# Patient Record
Sex: MALE | Race: OTHER | HISPANIC OR LATINO | Employment: OTHER | URBAN - METROPOLITAN AREA
[De-identification: names, ages, dates, MRNs, and addresses within clinical notes are randomized per-mention and may not be internally consistent; named-entity substitution may affect disease eponyms.]

---

## 2021-10-06 ENCOUNTER — OFFICE VISIT (OUTPATIENT)
Dept: FAMILY MEDICINE CLINIC | Facility: CLINIC | Age: 46
End: 2021-10-06

## 2021-10-06 VITALS
TEMPERATURE: 97.4 F | WEIGHT: 247 LBS | DIASTOLIC BLOOD PRESSURE: 84 MMHG | RESPIRATION RATE: 20 BRPM | SYSTOLIC BLOOD PRESSURE: 124 MMHG | HEART RATE: 84 BPM | HEIGHT: 66 IN | BODY MASS INDEX: 39.7 KG/M2

## 2021-10-06 DIAGNOSIS — M54.32 SCIATICA OF LEFT SIDE: ICD-10-CM

## 2021-10-06 DIAGNOSIS — B35.9 TINEA: ICD-10-CM

## 2021-10-06 DIAGNOSIS — K43.9 VENTRAL HERNIA WITHOUT OBSTRUCTION OR GANGRENE: Primary | ICD-10-CM

## 2021-10-06 PROCEDURE — 99203 OFFICE O/P NEW LOW 30 MIN: CPT | Performed by: FAMILY MEDICINE

## 2021-10-06 RX ORDER — METHYLPREDNISOLONE 4 MG/1
TABLET ORAL
Qty: 21 EACH | Refills: 0 | Status: SHIPPED | OUTPATIENT
Start: 2021-10-06

## 2021-10-06 RX ORDER — CLOTRIMAZOLE 1 %
CREAM (GRAM) TOPICAL 2 TIMES DAILY
Qty: 30 G | Refills: 0 | Status: SHIPPED | OUTPATIENT
Start: 2021-10-06

## 2022-05-11 ENCOUNTER — OFFICE VISIT (OUTPATIENT)
Dept: FAMILY MEDICINE CLINIC | Facility: CLINIC | Age: 47
End: 2022-05-11

## 2022-05-11 VITALS
HEART RATE: 62 BPM | BODY MASS INDEX: 39.34 KG/M2 | WEIGHT: 244.8 LBS | OXYGEN SATURATION: 96 % | HEIGHT: 66 IN | SYSTOLIC BLOOD PRESSURE: 132 MMHG | DIASTOLIC BLOOD PRESSURE: 90 MMHG | TEMPERATURE: 97.2 F

## 2022-05-11 DIAGNOSIS — K13.79 MASS OF MOUTH: Primary | ICD-10-CM

## 2022-05-11 PROCEDURE — 99202 OFFICE O/P NEW SF 15 MIN: CPT | Performed by: PHYSICIAN ASSISTANT

## 2022-05-11 NOTE — PROGRESS NOTES
Assessment/Plan:    Problem List Items Addressed This Visit    None     Visit Diagnoses     Mass of mouth    -  Primary    Relevant Orders    Ambulatory Referral to Otolaryngology           Diagnoses and all orders for this visit:    Mass of mouth  -     Ambulatory Referral to Otolaryngology; Future        Referral placed to ENT for further evaluation  Explained that he will need to have lesion biopsied and removed  He verbalized understanding  Subjective:      Patient ID: Clau Heard is a 51 y o  male  Isaiah Alford is a pleasant 51year old male who is here today to establish care and is complaining of a mass on the inside of his mouth  It started about 8 months ago as a small raised lump  Over the past 2 months it has grown in size  He denies any drainage  He denies any pain  He quit smoking over 5 years ago  He does not chew tobacco  He denies any similar lesions in his mouth  He denies any lymphadenopathy  He does not have any medical or surgical history  The following portions of the patient's history were reviewed and updated as appropriate:   He has no past medical history on file  ,  does not have a problem list on file  ,   has no past surgical history on file  ,  family history includes No Known Problems in his father and mother  ,   reports that he has quit smoking  He has never used smokeless tobacco  He reports current alcohol use  He reports previous drug use ,  has No Known Allergies     Current Outpatient Medications   Medication Sig Dispense Refill    clotrimazole (LOTRIMIN) 1 % cream Apply topically 2 (two) times a day (Patient not taking: Reported on 5/11/2022) 30 g 0    methylPREDNISolone 4 MG tablet therapy pack Use as directed on package (Patient not taking: Reported on 5/11/2022) 21 each 0     No current facility-administered medications for this visit  Review of Systems   Constitutional: Negative for chills, diaphoresis, fatigue and fever     HENT: Negative for congestion, ear pain, postnasal drip, rhinorrhea, sneezing, sore throat and trouble swallowing          +Mass in mouth   Eyes: Negative for pain and visual disturbance  Respiratory: Negative for apnea, cough, shortness of breath and wheezing  Cardiovascular: Negative for chest pain and palpitations  Gastrointestinal: Negative for abdominal pain, constipation, diarrhea, nausea and vomiting  Genitourinary: Negative for dysuria and hematuria  Musculoskeletal: Negative for arthralgias, gait problem and myalgias  Neurological: Negative for dizziness, syncope, weakness, light-headedness, numbness and headaches  Psychiatric/Behavioral: Negative for suicidal ideas  The patient is not nervous/anxious  Objective:  Vitals:    05/11/22 0740   BP: 132/90   Pulse: 62   Temp: (!) 97 2 °F (36 2 °C)   SpO2: 96%   Weight: 111 kg (244 lb 12 8 oz)   Height: 5' 6" (1 676 m)     Body mass index is 39 51 kg/m²  Physical Exam  Vitals and nursing note reviewed  Constitutional:       Appearance: He is well-developed  HENT:      Head: Normocephalic and atraumatic  Right Ear: Tympanic membrane, ear canal and external ear normal       Left Ear: Tympanic membrane, ear canal and external ear normal       Nose: Nose normal       Mouth/Throat:      Mouth: Oral lesions (Soft, flesh colored non-tender mass on inside of left lower mouth measuing approximately 0 5 x 0 5 cm  ) present  Pharynx: No oropharyngeal exudate or posterior oropharyngeal erythema  Tonsils: No tonsillar exudate or tonsillar abscesses  Eyes:      Extraocular Movements: Extraocular movements intact  Cardiovascular:      Rate and Rhythm: Normal rate and regular rhythm  Heart sounds: Normal heart sounds  No murmur heard  No friction rub  No gallop  Pulmonary:      Effort: Pulmonary effort is normal  No respiratory distress  Breath sounds: Normal breath sounds  No wheezing or rales     Musculoskeletal:         General: Normal range of motion  Cervical back: Normal range of motion and neck supple  No rigidity  Lymphadenopathy:      Head:      Right side of head: No submental or submandibular adenopathy  Left side of head: No submental or submandibular adenopathy  Cervical: No cervical adenopathy  Skin:     General: Skin is warm and dry  Neurological:      Mental Status: He is alert and oriented to person, place, and time  Psychiatric:         Behavior: Behavior normal          Thought Content:  Thought content normal          Judgment: Judgment normal

## 2024-05-15 ENCOUNTER — APPOINTMENT (EMERGENCY)
Dept: CT IMAGING | Facility: HOSPITAL | Age: 49
DRG: 244 | End: 2024-05-15
Payer: COMMERCIAL

## 2024-05-15 ENCOUNTER — HOSPITAL ENCOUNTER (INPATIENT)
Facility: HOSPITAL | Age: 49
LOS: 1 days | Discharge: HOME | DRG: 244 | End: 2024-05-17
Attending: EMERGENCY MEDICINE | Admitting: FAMILY MEDICINE
Payer: COMMERCIAL

## 2024-05-15 DIAGNOSIS — R11.2 NAUSEA AND VOMITING: Primary | ICD-10-CM

## 2024-05-15 DIAGNOSIS — K57.92 DIVERTICULITIS: ICD-10-CM

## 2024-05-15 DIAGNOSIS — K57.92 ACUTE DIVERTICULITIS: ICD-10-CM

## 2024-05-15 DIAGNOSIS — N28.89 RIGHT RENAL MASS: ICD-10-CM

## 2024-05-15 DIAGNOSIS — N28.89 RENAL MASS: ICD-10-CM

## 2024-05-15 DIAGNOSIS — R10.9 ABDOMINAL PAIN: ICD-10-CM

## 2024-05-15 LAB
ALBUMIN SERPL BCP-MCNC: 4.7 G/DL (ref 3.5–5)
ALP SERPL-CCNC: 82 U/L (ref 34–104)
ALT SERPL W P-5'-P-CCNC: 37 U/L (ref 7–52)
ANION GAP SERPL CALCULATED.3IONS-SCNC: 10 MMOL/L (ref 4–13)
AST SERPL W P-5'-P-CCNC: 23 U/L (ref 13–39)
BACTERIA UR QL AUTO: ABNORMAL /HPF
BASOPHILS # BLD AUTO: 0.04 THOUSANDS/ÂΜL (ref 0–0.1)
BASOPHILS NFR BLD AUTO: 1 % (ref 0–1)
BILIRUB SERPL-MCNC: 0.32 MG/DL (ref 0.2–1)
BILIRUB UR QL STRIP: NEGATIVE
BUN SERPL-MCNC: 18 MG/DL (ref 5–25)
CALCIUM SERPL-MCNC: 9.4 MG/DL (ref 8.4–10.2)
CARDIAC TROPONIN I PNL SERPL HS: 3 NG/L
CHLORIDE SERPL-SCNC: 105 MMOL/L (ref 96–108)
CLARITY UR: CLEAR
CO2 SERPL-SCNC: 27 MMOL/L (ref 21–32)
COLOR UR: YELLOW
CREAT SERPL-MCNC: 0.81 MG/DL (ref 0.6–1.3)
EOSINOPHIL # BLD AUTO: 0.21 THOUSAND/ÂΜL (ref 0–0.61)
EOSINOPHIL NFR BLD AUTO: 2 % (ref 0–6)
ERYTHROCYTE [DISTWIDTH] IN BLOOD BY AUTOMATED COUNT: 14.2 % (ref 11.6–15.1)
GFR SERPL CREATININE-BSD FRML MDRD: 104 ML/MIN/1.73SQ M
GLUCOSE SERPL-MCNC: 132 MG/DL (ref 65–140)
GLUCOSE UR STRIP-MCNC: NEGATIVE MG/DL
HCT VFR BLD AUTO: 42.4 % (ref 36.5–49.3)
HGB BLD-MCNC: 13.8 G/DL (ref 12–17)
HGB UR QL STRIP.AUTO: NEGATIVE
IMM GRANULOCYTES # BLD AUTO: 0.04 THOUSAND/UL (ref 0–0.2)
IMM GRANULOCYTES NFR BLD AUTO: 1 % (ref 0–2)
KETONES UR STRIP-MCNC: NEGATIVE MG/DL
LEUKOCYTE ESTERASE UR QL STRIP: NEGATIVE
LIPASE SERPL-CCNC: 8 U/L (ref 11–82)
LYMPHOCYTES # BLD AUTO: 1.83 THOUSANDS/ÂΜL (ref 0.6–4.47)
LYMPHOCYTES NFR BLD AUTO: 21 % (ref 14–44)
MCH RBC QN AUTO: 27.6 PG (ref 26.8–34.3)
MCHC RBC AUTO-ENTMCNC: 32.5 G/DL (ref 31.4–37.4)
MCV RBC AUTO: 85 FL (ref 82–98)
MONOCYTES # BLD AUTO: 0.43 THOUSAND/ÂΜL (ref 0.17–1.22)
MONOCYTES NFR BLD AUTO: 5 % (ref 4–12)
MUCOUS THREADS UR QL AUTO: ABNORMAL
NEUTROPHILS # BLD AUTO: 6.16 THOUSANDS/ÂΜL (ref 1.85–7.62)
NEUTS SEG NFR BLD AUTO: 70 % (ref 43–75)
NITRITE UR QL STRIP: NEGATIVE
NON-SQ EPI CELLS URNS QL MICRO: ABNORMAL /HPF
NRBC BLD AUTO-RTO: 0 /100 WBCS
PH UR STRIP.AUTO: 6.5 [PH]
PLATELET # BLD AUTO: 223 THOUSANDS/UL (ref 149–390)
PMV BLD AUTO: 11.5 FL (ref 8.9–12.7)
POTASSIUM SERPL-SCNC: 3.6 MMOL/L (ref 3.5–5.3)
PROT SERPL-MCNC: 7.7 G/DL (ref 6.4–8.4)
PROT UR STRIP-MCNC: ABNORMAL MG/DL
RBC # BLD AUTO: 5 MILLION/UL (ref 3.88–5.62)
RBC #/AREA URNS AUTO: ABNORMAL /HPF
SODIUM SERPL-SCNC: 142 MMOL/L (ref 135–147)
SP GR UR STRIP.AUTO: 1.02 (ref 1–1.03)
UROBILINOGEN UR STRIP-ACNC: <2 MG/DL
WBC # BLD AUTO: 8.71 THOUSAND/UL (ref 4.31–10.16)
WBC #/AREA URNS AUTO: ABNORMAL /HPF

## 2024-05-15 PROCEDURE — 36415 COLL VENOUS BLD VENIPUNCTURE: CPT | Performed by: EMERGENCY MEDICINE

## 2024-05-15 PROCEDURE — 96375 TX/PRO/DX INJ NEW DRUG ADDON: CPT

## 2024-05-15 PROCEDURE — 83690 ASSAY OF LIPASE: CPT | Performed by: EMERGENCY MEDICINE

## 2024-05-15 PROCEDURE — 96374 THER/PROPH/DIAG INJ IV PUSH: CPT

## 2024-05-15 PROCEDURE — 99285 EMERGENCY DEPT VISIT HI MDM: CPT | Performed by: EMERGENCY MEDICINE

## 2024-05-15 PROCEDURE — 93005 ELECTROCARDIOGRAM TRACING: CPT

## 2024-05-15 PROCEDURE — 99284 EMERGENCY DEPT VISIT MOD MDM: CPT

## 2024-05-15 PROCEDURE — C9113 INJ PANTOPRAZOLE SODIUM, VIA: HCPCS | Performed by: EMERGENCY MEDICINE

## 2024-05-15 PROCEDURE — 80053 COMPREHEN METABOLIC PANEL: CPT | Performed by: EMERGENCY MEDICINE

## 2024-05-15 PROCEDURE — 81001 URINALYSIS AUTO W/SCOPE: CPT | Performed by: EMERGENCY MEDICINE

## 2024-05-15 PROCEDURE — 74177 CT ABD & PELVIS W/CONTRAST: CPT

## 2024-05-15 PROCEDURE — 84484 ASSAY OF TROPONIN QUANT: CPT | Performed by: EMERGENCY MEDICINE

## 2024-05-15 PROCEDURE — 96361 HYDRATE IV INFUSION ADD-ON: CPT

## 2024-05-15 PROCEDURE — 85025 COMPLETE CBC W/AUTO DIFF WBC: CPT | Performed by: EMERGENCY MEDICINE

## 2024-05-15 RX ORDER — PANTOPRAZOLE SODIUM 40 MG/10ML
40 INJECTION, POWDER, LYOPHILIZED, FOR SOLUTION INTRAVENOUS ONCE
Status: COMPLETED | OUTPATIENT
Start: 2024-05-15 | End: 2024-05-15

## 2024-05-15 RX ORDER — MAGNESIUM HYDROXIDE/ALUMINUM HYDROXICE/SIMETHICONE 120; 1200; 1200 MG/30ML; MG/30ML; MG/30ML
30 SUSPENSION ORAL ONCE
Status: COMPLETED | OUTPATIENT
Start: 2024-05-15 | End: 2024-05-15

## 2024-05-15 RX ORDER — ONDANSETRON 2 MG/ML
4 INJECTION INTRAMUSCULAR; INTRAVENOUS ONCE
Status: COMPLETED | OUTPATIENT
Start: 2024-05-15 | End: 2024-05-15

## 2024-05-15 RX ADMIN — ALUMINUM HYDROXIDE, MAGNESIUM HYDROXIDE, AND SIMETHICONE 30 ML: 200; 200; 20 SUSPENSION ORAL at 22:21

## 2024-05-15 RX ADMIN — PANTOPRAZOLE SODIUM 40 MG: 40 INJECTION, POWDER, FOR SOLUTION INTRAVENOUS at 22:18

## 2024-05-15 RX ADMIN — IOHEXOL 100 ML: 350 INJECTION, SOLUTION INTRAVENOUS at 23:39

## 2024-05-15 RX ADMIN — ONDANSETRON 4 MG: 2 INJECTION INTRAMUSCULAR; INTRAVENOUS at 22:17

## 2024-05-15 RX ADMIN — SODIUM CHLORIDE 1000 ML: 0.9 INJECTION, SOLUTION INTRAVENOUS at 22:17

## 2024-05-16 ENCOUNTER — TELEPHONE (OUTPATIENT)
Dept: UROLOGY | Facility: CLINIC | Age: 49
End: 2024-05-16

## 2024-05-16 PROBLEM — N28.89 RIGHT RENAL MASS: Status: ACTIVE | Noted: 2024-05-16

## 2024-05-16 PROBLEM — R10.32 LEFT LOWER QUADRANT ABDOMINAL PAIN: Status: ACTIVE | Noted: 2024-05-16

## 2024-05-16 PROBLEM — K57.92 ACUTE DIVERTICULITIS: Status: ACTIVE | Noted: 2024-05-16

## 2024-05-16 LAB
2HR DELTA HS TROPONIN: 2 NG/L
4HR DELTA HS TROPONIN: 1 NG/L
ALBUMIN SERPL BCP-MCNC: 4.1 G/DL (ref 3.5–5)
ALP SERPL-CCNC: 67 U/L (ref 34–104)
ALT SERPL W P-5'-P-CCNC: 31 U/L (ref 7–52)
ANION GAP SERPL CALCULATED.3IONS-SCNC: 9 MMOL/L (ref 4–13)
AST SERPL W P-5'-P-CCNC: 18 U/L (ref 13–39)
ATRIAL RATE: 84 BPM
BASOPHILS # BLD AUTO: 0.04 THOUSANDS/ÂΜL (ref 0–0.1)
BASOPHILS NFR BLD AUTO: 1 % (ref 0–1)
BILIRUB SERPL-MCNC: 0.41 MG/DL (ref 0.2–1)
BUN SERPL-MCNC: 13 MG/DL (ref 5–25)
CALCIUM SERPL-MCNC: 8.9 MG/DL (ref 8.4–10.2)
CARDIAC TROPONIN I PNL SERPL HS: 4 NG/L
CARDIAC TROPONIN I PNL SERPL HS: 5 NG/L
CHLORIDE SERPL-SCNC: 107 MMOL/L (ref 96–108)
CO2 SERPL-SCNC: 24 MMOL/L (ref 21–32)
CREAT SERPL-MCNC: 0.67 MG/DL (ref 0.6–1.3)
EOSINOPHIL # BLD AUTO: 0.07 THOUSAND/ÂΜL (ref 0–0.61)
EOSINOPHIL NFR BLD AUTO: 1 % (ref 0–6)
ERYTHROCYTE [DISTWIDTH] IN BLOOD BY AUTOMATED COUNT: 14.3 % (ref 11.6–15.1)
GFR SERPL CREATININE-BSD FRML MDRD: 112 ML/MIN/1.73SQ M
GLUCOSE SERPL-MCNC: 117 MG/DL (ref 65–140)
HCT VFR BLD AUTO: 41.2 % (ref 36.5–49.3)
HGB BLD-MCNC: 13.4 G/DL (ref 12–17)
IMM GRANULOCYTES # BLD AUTO: 0.02 THOUSAND/UL (ref 0–0.2)
IMM GRANULOCYTES NFR BLD AUTO: 0 % (ref 0–2)
LYMPHOCYTES # BLD AUTO: 2.04 THOUSANDS/ÂΜL (ref 0.6–4.47)
LYMPHOCYTES NFR BLD AUTO: 25 % (ref 14–44)
MAGNESIUM SERPL-MCNC: 2.3 MG/DL (ref 1.9–2.7)
MCH RBC QN AUTO: 27.4 PG (ref 26.8–34.3)
MCHC RBC AUTO-ENTMCNC: 32.5 G/DL (ref 31.4–37.4)
MCV RBC AUTO: 84 FL (ref 82–98)
MONOCYTES # BLD AUTO: 0.48 THOUSAND/ÂΜL (ref 0.17–1.22)
MONOCYTES NFR BLD AUTO: 6 % (ref 4–12)
NEUTROPHILS # BLD AUTO: 5.69 THOUSANDS/ÂΜL (ref 1.85–7.62)
NEUTS SEG NFR BLD AUTO: 67 % (ref 43–75)
NRBC BLD AUTO-RTO: 0 /100 WBCS
P AXIS: 47 DEGREES
PHOSPHATE SERPL-MCNC: 3.3 MG/DL (ref 2.7–4.5)
PLATELET # BLD AUTO: 214 THOUSANDS/UL (ref 149–390)
PLATELET # BLD AUTO: 218 THOUSANDS/UL (ref 149–390)
PMV BLD AUTO: 11 FL (ref 8.9–12.7)
PMV BLD AUTO: 11.1 FL (ref 8.9–12.7)
POTASSIUM SERPL-SCNC: 3.6 MMOL/L (ref 3.5–5.3)
PR INTERVAL: 164 MS
PROCALCITONIN SERPL-MCNC: <0.05 NG/ML
PROT SERPL-MCNC: 6.7 G/DL (ref 6.4–8.4)
QRS AXIS: 52 DEGREES
QRSD INTERVAL: 96 MS
QT INTERVAL: 394 MS
QTC INTERVAL: 465 MS
RBC # BLD AUTO: 4.89 MILLION/UL (ref 3.88–5.62)
SODIUM SERPL-SCNC: 140 MMOL/L (ref 135–147)
T WAVE AXIS: 33 DEGREES
VENTRICULAR RATE: 84 BPM
WBC # BLD AUTO: 8.34 THOUSAND/UL (ref 4.31–10.16)

## 2024-05-16 PROCEDURE — 85049 AUTOMATED PLATELET COUNT: CPT | Performed by: PHYSICIAN ASSISTANT

## 2024-05-16 PROCEDURE — 83735 ASSAY OF MAGNESIUM: CPT | Performed by: PHYSICIAN ASSISTANT

## 2024-05-16 PROCEDURE — 84100 ASSAY OF PHOSPHORUS: CPT | Performed by: PHYSICIAN ASSISTANT

## 2024-05-16 PROCEDURE — 84145 PROCALCITONIN (PCT): CPT | Performed by: PHYSICIAN ASSISTANT

## 2024-05-16 PROCEDURE — 85025 COMPLETE CBC W/AUTO DIFF WBC: CPT | Performed by: PHYSICIAN ASSISTANT

## 2024-05-16 PROCEDURE — 99222 1ST HOSP IP/OBS MODERATE 55: CPT | Performed by: FAMILY MEDICINE

## 2024-05-16 PROCEDURE — 93010 ELECTROCARDIOGRAM REPORT: CPT | Performed by: INTERNAL MEDICINE

## 2024-05-16 PROCEDURE — 80053 COMPREHEN METABOLIC PANEL: CPT | Performed by: PHYSICIAN ASSISTANT

## 2024-05-16 PROCEDURE — 97166 OT EVAL MOD COMPLEX 45 MIN: CPT

## 2024-05-16 PROCEDURE — 99222 1ST HOSP IP/OBS MODERATE 55: CPT | Performed by: UROLOGY

## 2024-05-16 PROCEDURE — 36415 COLL VENOUS BLD VENIPUNCTURE: CPT | Performed by: PHYSICIAN ASSISTANT

## 2024-05-16 PROCEDURE — 84484 ASSAY OF TROPONIN QUANT: CPT | Performed by: PHYSICIAN ASSISTANT

## 2024-05-16 RX ORDER — CEFTRIAXONE 1 G/50ML
1000 INJECTION, SOLUTION INTRAVENOUS EVERY 24 HOURS
Status: DISCONTINUED | OUTPATIENT
Start: 2024-05-17 | End: 2024-05-17 | Stop reason: HOSPADM

## 2024-05-16 RX ORDER — CEFTRIAXONE 1 G/50ML
1000 INJECTION, SOLUTION INTRAVENOUS ONCE
Status: COMPLETED | OUTPATIENT
Start: 2024-05-16 | End: 2024-05-16

## 2024-05-16 RX ORDER — SODIUM CHLORIDE 9 MG/ML
100 INJECTION, SOLUTION INTRAVENOUS CONTINUOUS
Status: DISCONTINUED | OUTPATIENT
Start: 2024-05-16 | End: 2024-05-17 | Stop reason: HOSPADM

## 2024-05-16 RX ORDER — METRONIDAZOLE 500 MG/100ML
500 INJECTION, SOLUTION INTRAVENOUS EVERY 8 HOURS
Status: DISCONTINUED | OUTPATIENT
Start: 2024-05-16 | End: 2024-05-17 | Stop reason: HOSPADM

## 2024-05-16 RX ORDER — ACETAMINOPHEN 325 MG/1
650 TABLET ORAL EVERY 6 HOURS PRN
Status: DISCONTINUED | OUTPATIENT
Start: 2024-05-16 | End: 2024-05-17 | Stop reason: HOSPADM

## 2024-05-16 RX ORDER — ONDANSETRON 2 MG/ML
4 INJECTION INTRAMUSCULAR; INTRAVENOUS EVERY 6 HOURS PRN
Status: DISCONTINUED | OUTPATIENT
Start: 2024-05-16 | End: 2024-05-17 | Stop reason: HOSPADM

## 2024-05-16 RX ORDER — HEPARIN SODIUM 5000 [USP'U]/ML
5000 INJECTION, SOLUTION INTRAVENOUS; SUBCUTANEOUS EVERY 8 HOURS SCHEDULED
Status: DISCONTINUED | OUTPATIENT
Start: 2024-05-16 | End: 2024-05-17 | Stop reason: HOSPADM

## 2024-05-16 RX ADMIN — HEPARIN SODIUM 5000 UNITS: 5000 INJECTION, SOLUTION INTRAVENOUS; SUBCUTANEOUS at 06:01

## 2024-05-16 RX ADMIN — METRONIDAZOLE 500 MG: 500 INJECTION, SOLUTION INTRAVENOUS at 17:09

## 2024-05-16 RX ADMIN — HEPARIN SODIUM 5000 UNITS: 5000 INJECTION, SOLUTION INTRAVENOUS; SUBCUTANEOUS at 15:49

## 2024-05-16 RX ADMIN — METRONIDAZOLE 500 MG: 500 INJECTION, SOLUTION INTRAVENOUS at 03:01

## 2024-05-16 RX ADMIN — CEFTRIAXONE 1000 MG: 1 INJECTION, SOLUTION INTRAVENOUS at 02:12

## 2024-05-16 RX ADMIN — SODIUM CHLORIDE 100 ML/HR: 0.9 INJECTION, SOLUTION INTRAVENOUS at 02:12

## 2024-05-16 RX ADMIN — SODIUM CHLORIDE 100 ML/HR: 0.9 INJECTION, SOLUTION INTRAVENOUS at 17:09

## 2024-05-16 RX ADMIN — METRONIDAZOLE 500 MG: 500 INJECTION, SOLUTION INTRAVENOUS at 08:17

## 2024-05-16 RX ADMIN — HEPARIN SODIUM 5000 UNITS: 5000 INJECTION, SOLUTION INTRAVENOUS; SUBCUTANEOUS at 22:53

## 2024-05-16 NOTE — H&P
[facility]  H&P  Name: Pj Vences 48 y.o. male I MRN: [id number]  Unit/Bed#: RM13 I Date of Admission: 5/15/2024   Date of Service: 5/16/2024 I Hospital Day: 0      Assessment & Plan   * Acute diverticulitis  Assessment & Plan  Presented  to the ER for evaluation of complaint of left lower quadrant abdominal pain  Reports pain stated about 3 days ago getting progressively worst  Associated with nausea/vomiting  He denies fever, chills  CT shows-Findings suggestive of mild mid sigmoid colitis/diverticulitis versus early neoplasm as discussed above. Recommend follow-up colonoscopy when clinically appropriate/after resolution of acute illness. Extensive descending and sigmoid diverticulosis.   Started on IV Flagyl and IV ceftriaxone  --Admit to Med Surg  --Keep n.p.o. for now  --Continue IV fluids  --Continue IV Flagyl and IV ceftriaxone for now  --Pain medication PRN  --Am labs  --Supportive care     Left lower quadrant abdominal pain  Assessment & Plan  Presented  to the ER for evaluation of complaint of left lower quadrant abdominal pain  Reports pain stated about 3 days ago getting progressively worst  Associated with nausea/vomiting  CT shows-Findings suggestive of mild mid sigmoid colitis/diverticulitis versus early neoplasm as discussed above. Recommend follow-up colonoscopy when clinically appropriate/after resolution of acute illness. Extensive descending and sigmoid diverticulosis.   Received IV fluid,IV protonix in the ER   --See Plan for acute diverticulitis    Right renal mass  Assessment & Plan  Presented to the emergency room for complaint of abdominal pain  Reports onset of pain 3 days ago and progressively worse  Denies urinary symptoms  CT shows-Large heterogeneously enhancing 7.0 cm right midpole renal mass containing thin calcifications noted, highly concerning for renal cell carcinoma. Recommend nonemergent urology consultation.   --Continue IV fluids  --Monitor urinary  output, renal function  --Urology consult  --Consider Oncology consult  --Am labs  --Supportive care            VTE Pharmacologic Prophylaxis:   Moderate Risk (Score 3-4) - Pharmacological DVT Prophylaxis Ordered: heparin.  Code Status: Level 1 - Full Code Level 1 full code  Discussion with family: Updated  (wife and son) at bedside.    Anticipated Length of Stay: Patient will be admitted on an inpatient basis with an anticipated length of stay of greater than 2 midnights secondary to acute diverticulitis, right renal mass, abdominal pain.    Total Time Spent on Date of Encounter in care of patient: 40 mins. This time was spent on one or more of the following: performing physical exam; counseling and coordination of care; obtaining or reviewing history; documenting in the medical record; reviewing/ordering tests, medications or procedures; communicating with other healthcare professionals and discussing with patient's family/caregivers.    Chief Complaint: Abdominal pain    History of Present Illness:  Pj Vences is a 48 y.o. male with no documented significant PMH who presents to the emergency room for evaluation of complaint of abdominal pain.  Abdominal pain associated with nausea and vomiting.  Patient reports that his pain started about 3 days ago getting progressively worse.  Denies having any chest pain, as of breath, cough, fever, chills, urinary symptoms, hematuria.  Patient denies similar pain in the past.    Workup in the emergency room included labs otherwise within normal limits, urine negative for acute UTI, microscopic hematuria.  CT abdomen and pelvis completed with results as shown below.  EKG shows a normal sinus rhythm at a rate of 84 bpm.  In the ER patient given Maalox 30 mL p.o., Protonix 40 mg IV, normal saline 1 L, Zofran 4 mg IV, ceftriaxone 1 g IV, Flagyl 500 mg IV.    Patient is being admitted on inpatient status Medr level care for further workup and management of  acute diverticulitis, right renal mass, abdominal pain.        Review of Systems:  Review of Systems   Constitutional:  Negative for activity change, chills, fatigue and fever.   Eyes:  Negative for photophobia and visual disturbance.   Respiratory:  Negative for cough, chest tightness, shortness of breath and wheezing.    Cardiovascular:  Negative for chest pain, palpitations and leg swelling.   Gastrointestinal:  Positive for abdominal pain, nausea and vomiting. Negative for constipation and diarrhea.   Genitourinary:  Negative for difficulty urinating, dysuria, flank pain, frequency and hematuria.   Musculoskeletal:  Negative for arthralgias, back pain, gait problem, neck pain and neck stiffness.   Skin:  Negative for rash and wound.   Neurological:  Negative for dizziness, tremors, syncope, weakness and headaches.   Psychiatric/Behavioral:  Negative for agitation and confusion. The patient is not nervous/anxious.        Past Medical and Surgical History:   History reviewed. No pertinent past medical history.    History reviewed. No pertinent surgical history.    Meds/Allergies:  Prior to Admission medications    Medication Sig Start Date End Date Taking? Authorizing Provider   clotrimazole (LOTRIMIN) 1 % cream Apply topically 2 (two) times a day  Patient not taking: Reported on 5/11/2022 10/6/21   Shamar Jenkins DO   methylPREDNISolone 4 MG tablet therapy pack Use as directed on package  Patient not taking: Reported on 5/11/2022 10/6/21   Shamar Jenkins DO     I have reviewed home medications with patient family member.    Allergies: No Known Allergies    Social History:  Marital Status: /Civil Union   Occupation: [employer]  Patient Pre-hospital Living Situation: Home  Patient Pre-hospital Level of Mobility: walks  Patient Pre-hospital Diet Restrictions: None reported  Substance Use History:   Social History     Substance and Sexual Activity   Alcohol Use Yes    Comment: occasionally     Social  "History     Tobacco Use   Smoking Status Former   Smokeless Tobacco Never     Social History     Substance and Sexual Activity   Drug Use Not Currently       Family History:  Family History   Problem Relation Age of Onset    No Known Problems Mother     No Known Problems Father        Physical Exam:     Vitals:   Blood Pressure: 142/85 (05/16/24 0100)  Pulse: 74 (05/16/24 0100)  Temperature: 97.5 °F (36.4 °C) (05/15/24 2119)  Temp Source: Temporal (05/15/24 2119)  Respirations: 20 (05/16/24 0100)  Height: 5' 6\" (167.6 cm) (05/15/24 2119)  Weight - Scale: 110 kg (242 lb 11.6 oz) (05/15/24 2119)  SpO2: 98 % (05/16/24 0100)    Physical Exam  Constitutional:       General: He is not in acute distress.     Appearance: He is not ill-appearing.   HENT:      Head: Normocephalic and atraumatic.      Mouth/Throat:      Mouth: Mucous membranes are moist.      Pharynx: Oropharynx is clear. No oropharyngeal exudate or posterior oropharyngeal erythema.   Cardiovascular:      Rate and Rhythm: Normal rate and regular rhythm.      Pulses: Normal pulses.   Pulmonary:      Effort: No respiratory distress.      Breath sounds: No wheezing, rhonchi or rales.   Chest:      Chest wall: No tenderness.   Abdominal:      General: There is distension.      Palpations: Abdomen is soft.      Tenderness: There is no abdominal tenderness.   Musculoskeletal:      Cervical back: Neck supple.      Right lower leg: No edema.      Left lower leg: No edema.   Skin:     General: Skin is warm and dry.      Capillary Refill: Capillary refill takes less than 2 seconds.      Coloration: Skin is not jaundiced or pale.      Findings: No erythema, lesion or rash.   Neurological:      Mental Status: He is oriented to person, place, and time.   Psychiatric:         Mood and Affect: Mood normal.          Additional Data:     Lab Results:  Results from last 7 days   Lab Units 05/16/24  0212 05/15/24  2204   WBC Thousand/uL  --  8.71   HEMOGLOBIN g/dL  --  13.8 "   HEMATOCRIT %  --  42.4   PLATELETS Thousands/uL 214 223   SEGS PCT %  --  70   LYMPHO PCT %  --  21   MONO PCT %  --  5   EOS PCT %  --  2     Results from last 7 days   Lab Units 05/15/24  2204   SODIUM mmol/L 142   POTASSIUM mmol/L 3.6   CHLORIDE mmol/L 105   CO2 mmol/L 27   BUN mg/dL 18   CREATININE mg/dL 0.81   ANION GAP mmol/L 10   CALCIUM mg/dL 9.4   ALBUMIN g/dL 4.7   TOTAL BILIRUBIN mg/dL 0.32   ALK PHOS U/L 82   ALT U/L 37   AST U/L 23   GLUCOSE RANDOM mg/dL 132                       Lines/Drains:  Invasive Devices       Peripheral Intravenous Line  Duration             Peripheral IV 05/15/24 Right Antecubital <1 day    Peripheral IV 05/16/24 Dorsal (posterior);Right Hand <1 day                        Imaging: Reviewed radiology reports from this admission including: abdominal/pelvic CT  CT abdomen pelvis with contrast   Final Result by Jeffrey Glez MD (05/16 0053)      1.  Findings suggestive of mild mid sigmoid colitis/diverticulitis versus early neoplasm as discussed above. Recommend follow-up colonoscopy when clinically appropriate/after resolution of acute illness. Extensive descending and sigmoid diverticulosis.    No findings of bowel obstruction, appendicitis, free air, free fluid, or loculated fluid collections in the abdomen/pelvis.   2.  Large heterogeneously enhancing 7.0 cm right midpole renal mass containing thin calcifications noted, highly concerning for renal cell carcinoma. Recommend nonemergent urology consultation.      Workstation performed: [id number]             EKG and Other Studies Reviewed on Admission:   EKG: NSR. HR 84 bpm.    ** Please Note: This note has been constructed using a voice recognition system. **

## 2024-05-16 NOTE — ED PROVIDER NOTES
History  Chief Complaint   Patient presents with    Vomiting     Patient started not feeling well for the past 3 days has nausea, vomiting, dizziness.      48-year-old male with no significant past medical history who presents for epigastric pain, nausea and vomiting.  Patient reports that he has had epigastric discomfort for the past 3 days or so.  He is also noticed occasional pinkish color stool, concerning for blood.  The pain worsened today, and over the past 4 to 5 hours he started feeling somewhat lightheaded, and had worsening nausea and vomiting.  No blood with vomiting, no coffee-ground emesis.  No chest pain or shortness of breath.  No fevers or chills.  No diarrhea.  No dysuria or frequency, he does report some left flank pain.  ROS otherwise negative.        None       History reviewed. No pertinent past medical history.    History reviewed. No pertinent surgical history.    Family History   Problem Relation Age of Onset    No Known Problems Mother     No Known Problems Father      I have reviewed and agree with the history as documented.    E-Cigarette/Vaping    E-Cigarette Use Never User      E-Cigarette/Vaping Substances     Social History     Tobacco Use    Smoking status: Former    Smokeless tobacco: Never   Vaping Use    Vaping status: Never Used   Substance Use Topics    Alcohol use: Yes     Comment: occasionally    Drug use: Not Currently       Review of Systems   Constitutional:  Negative for chills, diaphoresis, fatigue and fever.   HENT:  Negative for congestion and sore throat.    Eyes:  Negative for visual disturbance.   Respiratory:  Negative for cough, chest tightness and shortness of breath.    Cardiovascular:  Negative for chest pain, palpitations and leg swelling.   Gastrointestinal:  Positive for abdominal pain, nausea and vomiting. Negative for abdominal distention, constipation and diarrhea.   Genitourinary:  Positive for flank pain. Negative for difficulty urinating and dysuria.    Musculoskeletal:  Negative for arthralgias and myalgias.   Skin:  Negative for rash.   Neurological:  Negative for dizziness, weakness, light-headedness, numbness and headaches.   Psychiatric/Behavioral:  Negative for agitation, behavioral problems and confusion. The patient is not nervous/anxious.    All other systems reviewed and are negative.      Physical Exam  Physical Exam  Constitutional:       Appearance: He is well-developed.   HENT:      Head: Normocephalic and atraumatic.   Cardiovascular:      Rate and Rhythm: Normal rate and regular rhythm.      Heart sounds: Normal heart sounds. No murmur heard.  Pulmonary:      Effort: Pulmonary effort is normal. No respiratory distress.      Breath sounds: Normal breath sounds.   Abdominal:      General: Bowel sounds are normal. There is no distension.      Palpations: Abdomen is soft.      Tenderness: There is abdominal tenderness. There is no right CVA tenderness or left CVA tenderness.      Comments: Mild epigastric tenderness, no guarding or rigidity.  No other tenderness throughout the abdomen.   Musculoskeletal:         General: No deformity.   Skin:     General: Skin is warm.      Findings: No rash.   Neurological:      Mental Status: He is alert and oriented to person, place, and time.   Psychiatric:         Behavior: Behavior normal.         Thought Content: Thought content normal.         Judgment: Judgment normal.         Vital Signs  ED Triage Vitals   Temperature Pulse Respirations Blood Pressure SpO2   05/15/24 2119 05/15/24 2117 05/15/24 2119 05/15/24 2117 05/15/24 2117   97.5 °F (36.4 °C) 64 18 138/87 95 %      Temp Source Heart Rate Source Patient Position - Orthostatic VS BP Location FiO2 (%)   05/15/24 2119 05/16/24 0000 05/15/24 2119 05/15/24 2119 --   Temporal Monitor Lying Right arm       Pain Score       05/15/24 2119       8           Vitals:    05/15/24 2232 05/16/24 0000 05/16/24 0030 05/16/24 0100   BP: 151/93 152/87 143/87 142/85    Pulse: 70 63 59 74   Patient Position - Orthostatic VS:  Sitting Sitting          Visual Acuity      ED Medications  Medications   cefTRIAXone (ROCEPHIN) IVPB (premix in dextrose) 1,000 mg 50 mL (1,000 mg Intravenous New Bag 5/16/24 0212)   metroNIDAZOLE (FLAGYL) IVPB (premix) 500 mg 100 mL (has no administration in time range)   sodium chloride 0.9 % infusion (100 mL/hr Intravenous New Bag 5/16/24 0212)   acetaminophen (TYLENOL) tablet 650 mg (has no administration in time range)   ondansetron (ZOFRAN) injection 4 mg (has no administration in time range)   heparin (porcine) subcutaneous injection 5,000 Units (has no administration in time range)   sodium chloride 0.9 % bolus 1,000 mL (0 mL Intravenous Stopped 5/16/24 0019)   ondansetron (ZOFRAN) injection 4 mg (4 mg Intravenous Given 5/15/24 2217)   aluminum-magnesium hydroxide-simethicone (MAALOX) oral suspension 30 mL (30 mL Oral Given 5/15/24 2221)   pantoprazole (PROTONIX) injection 40 mg (40 mg Intravenous Given 5/15/24 2218)   iohexol (OMNIPAQUE) 350 MG/ML injection (MULTI-DOSE) 100 mL (100 mL Intravenous Given 5/15/24 2339)       Diagnostic Studies  Results Reviewed       Procedure Component Value Units Date/Time    Platelet count [[id number]]  (Normal) Collected: 05/16/24 0212    Lab Status: Final result Specimen: Blood from Arm, Right Updated: 05/16/24 0217     Platelets 214 Thousands/uL      MPV 11.0 fL     HS Troponin I 4hr [[id number]] Collected: 05/16/24 0212    Lab Status: In process Specimen: Blood from Arm, Right Updated: 05/16/24 0215    HS Troponin I 2hr [[id number]]  (Normal) Collected: 05/15/24 2355    Lab Status: Final result Specimen: Blood from Arm, Right Updated: 05/16/24 0021     hs TnI 2hr 5 ng/L      Delta 2hr hsTnI 2 ng/L     Urine Microscopic [[id number]]  (Abnormal) Collected: 05/15/24 2300    Lab Status: Final result Specimen: Urine, Clean Catch Updated: 05/15/24 2323     RBC, UA None Seen /hpf      WBC, UA None Seen /hpf       Epithelial Cells Occasional /hpf      Bacteria, UA None Seen /hpf      MUCUS THREADS Occasional    UA w Reflex to Microscopic w Reflex to Culture [[id number]]  (Abnormal) Collected: 05/15/24 2300    Lab Status: Final result Specimen: Urine, Clean Catch Updated: 05/15/24 2309     Color, UA Yellow     Clarity, UA Clear     Specific Gravity, UA 1.025     pH, UA 6.5     Leukocytes, UA Negative     Nitrite, UA Negative     Protein, UA Trace mg/dl      Glucose, UA Negative mg/dl      Ketones, UA Negative mg/dl      Urobilinogen, UA <2.0 mg/dl      Bilirubin, UA Negative     Occult Blood, UA Negative    HS Troponin 0hr (reflex protocol) [[id number]]  (Normal) Collected: 05/15/24 2204    Lab Status: Final result Specimen: Blood from Arm, Right Updated: 05/15/24 2231     hs TnI 0hr 3 ng/L     Comprehensive metabolic panel [[id number]] Collected: 05/15/24 2204    Lab Status: Final result Specimen: Blood from Arm, Right Updated: 05/15/24 2225     Sodium 142 mmol/L      Potassium 3.6 mmol/L      Chloride 105 mmol/L      CO2 27 mmol/L      ANION GAP 10 mmol/L      BUN 18 mg/dL      Creatinine 0.81 mg/dL      Glucose 132 mg/dL      Calcium 9.4 mg/dL      AST 23 U/L      ALT 37 U/L      Alkaline Phosphatase 82 U/L      Total Protein 7.7 g/dL      Albumin 4.7 g/dL      Total Bilirubin 0.32 mg/dL      eGFR 104 ml/min/1.73sq m     Narrative:      National Kidney Disease Foundation guidelines for Chronic Kidney Disease (CKD):     Stage 1 with normal or high GFR (GFR > 90 mL/min/1.73 square meters)    Stage 2 Mild CKD (GFR = 60-89 mL/min/1.73 square meters)    Stage 3A Moderate CKD (GFR = 45-59 mL/min/1.73 square meters)    Stage 3B Moderate CKD (GFR = 30-44 mL/min/1.73 square meters)    Stage 4 Severe CKD (GFR = 15-29 mL/min/1.73 square meters)    Stage 5 End Stage CKD (GFR <15 mL/min/1.73 square meters)  Note: GFR calculation is accurate only with a steady state creatinine    Lipase [[id number]]  (Abnormal) Collected: 05/15/24 2204     Lab Status: Final result Specimen: Blood from Arm, Right Updated: 05/15/24 2225     Lipase 8 u/L     CBC and differential [[id number]] Collected: 05/15/24 2204    Lab Status: Final result Specimen: Blood from Arm, Right Updated: 05/15/24 2209     WBC 8.71 Thousand/uL      RBC 5.00 Million/uL      Hemoglobin 13.8 g/dL      Hematocrit 42.4 %      MCV 85 fL      MCH 27.6 pg      MCHC 32.5 g/dL      RDW 14.2 %      MPV 11.5 fL      Platelets 223 Thousands/uL      nRBC 0 /100 WBCs      Segmented % 70 %      Immature Grans % 1 %      Lymphocytes % 21 %      Monocytes % 5 %      Eosinophils Relative 2 %      Basophils Relative 1 %      Absolute Neutrophils 6.16 Thousands/µL      Absolute Immature Grans 0.04 Thousand/uL      Absolute Lymphocytes 1.83 Thousands/µL      Absolute Monocytes 0.43 Thousand/µL      Eosinophils Absolute 0.21 Thousand/µL      Basophils Absolute 0.04 Thousands/µL                    CT abdomen pelvis with contrast   Final Result by Jeffrey Glez MD (05/16 0053)      1.  Findings suggestive of mild mid sigmoid colitis/diverticulitis versus early neoplasm as discussed above. Recommend follow-up colonoscopy when clinically appropriate/after resolution of acute illness. Extensive descending and sigmoid diverticulosis.    No findings of bowel obstruction, appendicitis, free air, free fluid, or loculated fluid collections in the abdomen/pelvis.   2.  Large heterogeneously enhancing 7.0 cm right midpole renal mass containing thin calcifications noted, highly concerning for renal cell carcinoma. Recommend nonemergent urology consultation.      Workstation performed: [id number]                    Procedures  Procedures         ED Course  ED Course as of 05/16/24 0225   Wed May 15, 2024   2316 Pt feels improved after medications, does have persistent epigastric pain although partially improved, will obtain CT abd/pelvis for further evaluation.   2343 Informed that we do not have proper dosage of doxycycline,  will instruct patient to    u May 16, 2024   0055 Findings suggestive of mild mid sigmoid colitis/diverticulitis versus early neoplasm as discussed above. Recommend follow-up colonoscopy when clinically appropriate/after resolution of acute illness. Extensive descending and sigmoid diverticulosis.   No findings of bowel obstruction, appendicitis, free air, free fluid, or loculated fluid collections in the abdomen/pelvis.  2.  Large heterogeneously enhancing 7.0 cm right midpole renal mass containing thin calcifications noted, highly concerning for renal cell carcinoma. Recommend nonemergent urology consultation.                                    SBIRT 22yo+      Flowsheet Row Most Recent Value   Initial Alcohol Screen: US AUDIT-C     1. How often do you have a drink containing alcohol? 0 Filed at: 05/15/2024 2118   3a. Male UNDER 65: How often do you have five or more drinks on one occasion? 0 Filed at: 05/15/2024 2118   3b. FEMALE Any Age, or MALE 65+: How often do you have 4 or more drinks on one occassion? 0 Filed at: 05/15/2024 2118   Audit-C Score 0 Filed at: 05/15/2024 2118   JOSE: How many times in the past year have you...    Used an illegal drug or used a prescription medication for non-medical reasons? Never Filed at: 05/15/2024 2118                      Medical Decision Making  I reviewed the patient's medical chart, PMHx, prior encounters, medications.    My independent interpretation of ECG:    My DDx includes: Gastritis, PUD, cholecystitis, pancreatitis, diverticulitis, gastritis, colitis, kidney stone, viral syndrome. At risk for UTI, pyelonephritis.    Will obtain GI labs including CBC, CMP to evaluate electrolytes and kidney function, lipase to evaluate pancreas. Will check UA. Will treat supportively, reassess.     Labs are unremarkable. Normal troponin. Normal white count.     Pt found to have diverticulitis on CT scan. Ceftriaxone/flagyl started.  Additionally there is a large renal mass.  Given patient does not appear to have any established physician, and CT scan is rather impressive, discussed with SLIM who suspected admission would be reasonable to help facilitate next stages of management.          Amount and/or Complexity of Data Reviewed  Labs: ordered.  Radiology: ordered.    Risk  OTC drugs.  Prescription drug management.  Decision regarding hospitalization.             Disposition  Final diagnoses:   Nausea and vomiting   Diverticulitis   Abdominal pain   Renal mass     Time reflects when diagnosis was documented in both MDM as applicable and the Disposition within this note       Time User Action Codes Description Comment    5/16/2024  1:17 AM Servando Lind [R11.2] Nausea and vomiting     5/16/2024  1:17 AM Servando Lind [K57.92] Diverticulitis     5/16/2024  1:17 AM Servando Lind [R10.9] Abdominal pain     5/16/2024  1:17 AM Servando Lind [N28.89] Renal mass     5/16/2024  1:44 AM Phillip Solomon Add [N28.89] Right renal mass           ED Disposition       ED Disposition   Admit    Condition   Stable    Date/Time   Thu May 16, 2024 0116    Comment   Case was discussed with CHRIS and the patient's admission status was agreed to be Admission Status: inpatient status to the service of   .               Follow-up Information    None         Patient's Medications   Discharge Prescriptions    No medications on file       No discharge procedures on file.    PDMP Review         Value Time User    PDMP Reviewed  Yes 5/16/2024  1:44 AM Phillip Solomon PA-C            ED Provider  Electronically Signed by             Servando Lind MD  05/16/24 3402

## 2024-05-16 NOTE — ASSESSMENT & PLAN NOTE
Presented  to the ER for evaluation of complaint of left lower quadrant abdominal pain  Reports pain stated about 3 days ago getting progressively worst  Associated with nausea/vomiting  CT shows-Findings suggestive of mild mid sigmoid colitis/diverticulitis versus early neoplasm as discussed above. Recommend follow-up colonoscopy when clinically appropriate/after resolution of acute illness. Extensive descending and sigmoid diverticulosis.   Received IV fluid,IV protonix in the ER   --See Plan for acute diverticulitis

## 2024-05-16 NOTE — CASE MANAGEMENT
Case Management Assessment    Patient name Pj Vences  Location /409-01 MRN [id number]  : 1975 Date 2024       Current Admission Date: 5/15/2024  Current Admission Diagnosis:Acute diverticulitis   Patient Active Problem List    Diagnosis Date Noted Date Diagnosed    Acute diverticulitis 2024     Right renal mass 2024     Left lower quadrant abdominal pain 2024       LOS (days): 0  Geometric Mean LOS (GMLOS) (days):   Days to GMLOS:     OBJECTIVE:    Risk of Unplanned Readmission Score: 8.41         Current admission status: Inpatient       Preferred Pharmacy:   [id number] [place] [facility] [address]  [address] [facility] [address]  Phone: [phone] Fax: [phone]    Primary Care Provider: Amaris Junior PA-C    Primary Insurance:   Secondary Insurance:     ASSESSMENT:  Active Health Care Proxies       Alyssia Lock Health Care Representative - Spouse   Primary Phone: [phone] (Home)                 Advance Directives  Does patient have a Health Care POA?: No  Was patient offered paperwork?: Yes (declined)  Does patient currently have a Health Care decision maker?: Yes, please see Health Care Proxy section  Does patient have Advance Directives?: No  Was patient offered paperwork?: Yes (declined)  Primary Contact: Alyssia Lock (Spouse)         Readmission Root Cause  30 Day Readmission: No    Patient Information  Admitted from:: Home  Mental Status: Alert  During Assessment patient was accompanied by: Spouse  Assessment information provided by:: Patient  Primary Caregiver: Self  Support Systems: Self, Spouse/significant other  [facility]  What city do you live in?: [place]  Home entry access options. Select all that apply.: Stairs  Number of steps to enter home.: 6  Do the steps have railings?: Yes  Type of Current Residence: [address] [place] [address] home  Upon entering residence, is there a bedroom on the main floor (no further steps)?:  No  A bedroom is located on the following floor levels of residence (select all that apply):: 2nd Floor  Upon entering residence, is there a bathroom on the main floor (no further steps)?: No  Indicate which floors of current residence have a bathroom (select all the apply):: 2nd Floor  Number of steps to 2nd floor from main floor: One Flight  Living Arrangements: Lives w/ Spouse/significant other  Is patient a ?: No    Activities of Daily Living Prior to Admission  Functional Status: Independent  Completes ADLs independently?: Yes  Ambulates independently?: Yes  Does patient use assisted devices?: No  Does patient currently own DME?: No  Does patient have a history of Outpatient Therapy (PT/OT)?: No  Does the patient have a history of Short-Term Rehab?: No  Does patient have a history of HHC?: No  Does patient currently have HHC?: No         Patient Information Continued  Income Source: Employed  Does patient have prescription coverage?: No  Does patient receive dialysis treatments?: No  Does patient have a history of substance abuse?: No  Does patient have a history of Mental Health Diagnosis?: No         Means of Transportation  Means of Transport to [facility]:: Family transport  Cm met with the patient to evaluate the patients prior function and living situation and any barriers to d/c and form a safe d/c plan. Cm also evaluated the patient for any services in the home or needs for services. CM also discussed with patient that their preferences will be taken into account/consideration.  Wife at bedside. All information obtained via SMRxT [id number] translation. Pt admitted with diverticulitis. Pt does not have medical insurance, referral/email sent to financial counselor. Provided pt wife with Good Rx card if pt would need any medications. Pt does have funds to afford medications if low cost as he is employed. CM to follow for any discharge needs.

## 2024-05-16 NOTE — ASSESSMENT & PLAN NOTE
Presented to the emergency room for complaint of abdominal pain  Reports onset of pain 3 days ago and progressively worse  Denies urinary symptoms  CT shows-Large heterogeneously enhancing 7.0 cm right midpole renal mass containing thin calcifications noted, highly concerning for renal cell carcinoma. Recommend nonemergent urology consultation.   --Continue IV fluids  --Monitor urinary output, renal function  --Urology consult  --Consider Oncology consult  --Am labs  --Supportive care

## 2024-05-16 NOTE — TELEPHONE ENCOUNTER
----- Message from Javon Alegre MD sent at 5/16/2024 11:49 AM EDT -----  Please get pt in for renal mass discussion

## 2024-05-16 NOTE — PLAN OF CARE
Problem: Potential for Falls  Goal: Patient will remain free of falls  Description: INTERVENTIONS:  - Educate patient/family on patient safety including physical limitations  - Instruct patient to call for assistance with activity   - Consult OT/PT to assist with strengthening/mobility   - Keep Call bell within reach  - Keep bed low and locked with side rails adjusted as appropriate  - Keep care items and personal belongings within reach  - Initiate and maintain comfort rounds  - Make Fall Risk Sign visible to staff  - Offer Toileting every 2 Hours, in advance of need  - Initiate/Maintain bed/chair alarm  - Obtain necessary fall risk management equipment: non skid socks, call bell in reach   - Apply yellow socks and bracelet for high fall risk patients  - Consider moving patient to room near nurses station  Outcome: Progressing     Problem: Prexisting or High Potential for Compromised Skin Integrity  Goal: Skin integrity is maintained or improved  Description: INTERVENTIONS:  - Identify patients at risk for skin breakdown  - Assess and monitor skin integrity  - Assess and monitor nutrition and hydration status  - Monitor labs   - Assess for incontinence   - Turn and reposition patient  - Assist with mobility/ambulation  - Relieve pressure over bony prominences  - Avoid friction and shearing  - Provide appropriate hygiene as needed including keeping skin clean and dry  - Evaluate need for skin moisturizer/barrier cream  - Collaborate with interdisciplinary team   - Patient/family teaching  - Consider wound care consult   Outcome: Progressing     Problem: PAIN - ADULT  Goal: Verbalizes/displays adequate comfort level or baseline comfort level  Description: Interventions:  - Encourage patient to monitor pain and request assistance  - Assess pain using appropriate pain scale  - Administer analgesics based on type and severity of pain and evaluate response  - Implement non-pharmacological measures as appropriate and  evaluate response  - Consider cultural and social influences on pain and pain management  - Notify physician/advanced practitioner if interventions unsuccessful or patient reports new pain  Outcome: Progressing     Problem: INFECTION - ADULT  Goal: Absence or prevention of progression during hospitalization  Description: INTERVENTIONS:  - Assess and monitor for signs and symptoms of infection  - Monitor lab/diagnostic results  - Monitor all insertion sites, i.e. indwelling lines, tubes, and drains  - Monitor endotracheal if appropriate and nasal secretions for changes in amount and color  - [place] appropriate cooling/warming therapies per order  - Administer medications as ordered  - Instruct and encourage patient and family to use good hand hygiene technique  - Identify and instruct in appropriate isolation precautions for identified infection/condition  Outcome: Progressing     Problem: SAFETY ADULT  Goal: Patient will remain free of falls  Description: INTERVENTIONS:  - Educate patient/family on patient safety including physical limitations  - Instruct patient to call for assistance with activity   - Consult OT/PT to assist with strengthening/mobility   - Keep Call bell within reach  - Keep bed low and locked with side rails adjusted as appropriate  - Keep care items and personal belongings within reach  - Initiate and maintain comfort rounds  - Make Fall Risk Sign visible to staff  - Offer Toileting every 2 Hours, in advance of need  - Initiate/Maintain bed/chair alarm  - Obtain necessary fall risk management equipment: non skid socks, call bell in reach   - Apply yellow socks and bracelet for high fall risk patients  - Consider moving patient to room near nurses station  Outcome: Progressing  Goal: Maintain or return to baseline ADL function  Description: INTERVENTIONS:  -  Assess patient's ability to carry out ADLs; assess patient's baseline for ADL function and identify physical deficits which impact  ability to perform ADLs (bathing, care of mouth/teeth, toileting, grooming, dressing, etc.)  - Assess/evaluate cause of self-care deficits   - Assess range of motion  - Assess patient's mobility; develop plan if impaired  - Assess patient's need for assistive devices and provide as appropriate  - Encourage maximum independence but intervene and supervise when necessary  - Involve family in performance of ADLs  - Assess for home care needs following discharge   - Consider OT consult to assist with ADL evaluation and planning for discharge  - Provide patient education as appropriate  Outcome: Progressing  Goal: Maintains/Returns to pre admission functional level  Description: INTERVENTIONS:  - Perform AM-PAC 6 Click Basic Mobility/ Daily Activity assessment daily.  - Set and communicate daily mobility goal to care team and patient/family/caregiver.   - Collaborate with rehabilitation services on mobility goals if consulted  - Perform Range of Motion 3 times a day.  - Reposition patient every 3 hours.  - Dangle patient 3 times a day  - Stand patient 3 times a day  - Ambulate patient 3 times a day  - Out of bed to chair 3 times a day   - Out of bed for meals 3 times a day  - Out of bed for toileting  - Record patient progress and toleration of activity level   Outcome: Progressing     Problem: DISCHARGE PLANNING  Goal: Discharge to home or other facility with appropriate resources  Description: INTERVENTIONS:  - Identify barriers to discharge w/patient and caregiver  - Arrange for needed discharge resources and transportation as appropriate  - Identify discharge learning needs (meds, wound care, etc.)  - Arrange for interpretive services to assist at discharge as needed  - Refer to Case Management Department for coordinating discharge planning if the patient needs post-hospital services based on physician/advanced practitioner order or complex needs related to functional status, cognitive ability, or social support  system  Outcome: Progressing     Problem: Knowledge Deficit  Goal: Patient/family/caregiver demonstrates understanding of disease process, treatment plan, medications, and discharge instructions  Description: Complete learning assessment and assess knowledge base.  Interventions:  - Provide teaching at level of understanding  - Provide teaching via preferred learning methods  Outcome: Progressing     Problem: GENITOURINARY - ADULT  Goal: Maintains or returns to baseline urinary function  Description: INTERVENTIONS:  - Assess urinary function  - Encourage oral fluids to ensure adequate hydration if ordered  - Administer IV fluids as ordered to ensure adequate hydration  - Administer ordered medications as needed  - Offer frequent toileting  - Follow urinary retention protocol if ordered  Outcome: Progressing  Goal: Absence of urinary retention  Description: INTERVENTIONS:  - Assess patient’s ability to void and empty bladder  - Monitor I/O  - Bladder scan as needed  - Discuss with physician/AP medications to alleviate retention as needed  - Discuss catheterization for long term situations as appropriate  Outcome: Progressing  Goal: Urinary catheter remains patent  Description: INTERVENTIONS:  - Assess patency of urinary catheter  - If patient has a chronic luis, consider changing catheter if non-functioning  - Follow guidelines for intermittent irrigation of non-functioning urinary catheter  Outcome: Progressing

## 2024-05-16 NOTE — OCCUPATIONAL THERAPY NOTE
"    Occupational Therapy Evaluation     Patient Name: Pj Vences  Today's Date: 5/16/2024  Problem List  Principal Problem:    Acute diverticulitis  Active Problems:    Right renal mass    Left lower quadrant abdominal pain    Past Medical History  History reviewed. No pertinent past medical history.  Past Surgical History  History reviewed. No pertinent surgical history.          05/16/24 1416   OT Last Visit   OT Visit Date 05/16/24   Note Type   Note type Evaluation   Pain Assessment   Pain Score No Pain   Restrictions/Precautions   Weight Bearing Precautions Per Order No   Other Precautions Telemetry;Multiple lines   Home Living   Type of Home House   Home Layout Two level;Bed/bath upstairs;Stairs to enter with rails;Other (Comment)  (6 INES c HR; FOS to 2nd c HR)   Bathroom Accessibility Accessible   Home Equipment   (no DME)   Additional Comments pt does not utilize device at baseline during mobility   Prior Function   Level of [place] Independent with ADLs;Independent with functional mobility;Independent with IADLS   Lives With Family   IADLs Family/Friend/Other provides transportation   Falls in the last 6 months 0   Vocational Full time employment   Comments pt reports full time job, however does not drive   Subjective   Subjective \"what was your name?\"   ADL   Where Assessed Edge of bed   LB Dressing Assistance 7  Independent   Additional Comments pt with no difficulties to don socks seated EOB   Bed Mobility   Supine to Sit 7  Independent   Sit to Supine 7  Independent   Additional Comments pt on RA during session; SPO2 WFL with no complaints of SOB   Transfers   Sit to Stand 7  Independent   Stand to Sit 7  Independent   Additional Comments pt performs functional transfers with (I) level; no significant LOB or instability; no device   Functional Mobility   Functional Mobility 7  Independent   Additional Comments pt performs ~200ft with no device during functional mobility; no LOB or instability "   Additional items   (no device)   Balance   Static Sitting Good   Dynamic Sitting Good   Static Standing Good   Dynamic Standing Fair +   Ambulatory Fair +   Activity Tolerance   Activity Tolerance Patient tolerated treatment well   RUE Assessment   RUE Assessment WFL   LUE Assessment   LUE Assessment WFL   Hand Function   Gross Motor Coordination Functional   Fine Motor Coordination Functional   Sensation   Light Touch No apparent deficits   Sharp/Dull No apparent deficits   Psychosocial   Psychosocial (WDL) WDL   Cognition   Overall Cognitive Status WFL   Arousal/Participation Alert   Attention Within functional limits   Orientation Level Oriented X4   Memory Within functional limits   Following Commands Follows all commands and directions without difficulty   Assessment   Assessment Patient is a 48 y.o. male [facility] due to Acute diverticulitis. Pt performed at (I) level with no OT needs identified at this time Comorbidities affecting pt's physical performance at time of assessment include  diverticulitis, right renal mass, L lower quadrant abdominal pain .  The patient's raw score on the -PAC Daily Activity Inpatient Short Form is 24. A raw score of greater than or equal to 19 suggests the patient may benefit from discharge to home. Please refer to the recommendation of the Occupational Therapist for safe discharge planning.  From OT standpoint recommend anticipate no needs upon D/C. No further acute OT needs identified at this time. Recommend continued mobilization with hospital staff and restorative services while in the hospital to increase pt’s endurance and strength upon D/C. From OT standpoint, recommend D/C to home with family support when medically cleared. D/C pt from OT caseload at this time. Pt benefited from co-evaluation of skilled OT and PT therapists in order to most appropriately address functional deficits d/t extensive assistance required for safe functional  mobility, decreased activity tolerance, and regression from functioning level prior to admission and/or onset of present illness. OT/PT objectives were addressed separately; please see PT note for specific goal areas targeted.   Goals   Patient Goals to go home   Discharge Recommendation   Rehab Resource Intensity Level, OT No post-acute rehabilitation needs   AM-PAC Daily Activity Inpatient   Lower Body Dressing 4   Bathing 4   Toileting 4   Upper Body Dressing 4   Grooming 4   Eating 4   Daily Activity Raw Score 24   Daily Activity Standardized Score (Calc for Raw Score >=11) 57.54   AM-PAC Applied Cognition Inpatient   Following a Speech/Presentation 4   Understanding Ordinary Conversation 4   Taking Medications 4   Remembering Where Things Are Placed or Put Away 4   Remembering List of 4-5 Errands 4   Taking Care of Complicated Tasks 4   Applied Cognition Raw Score 24   Applied Cognition Standardized Score 62.21

## 2024-05-16 NOTE — ASSESSMENT & PLAN NOTE
Presented  to the ER for evaluation of complaint of left lower quadrant abdominal pain  Reports pain stated about 3 days ago getting progressively worst  Associated with nausea/vomiting  He denies fever, chills  CT shows-Findings suggestive of mild mid sigmoid colitis/diverticulitis versus early neoplasm as discussed above. Recommend follow-up colonoscopy when clinically appropriate/after resolution of acute illness. Extensive descending and sigmoid diverticulosis.   Started on IV Flagyl and IV ceftriaxone  --Admit to Med Surg  --Keep n.p.o. for now  --Continue IV fluids  --Continue IV Flagyl and IV ceftriaxone for now  --Pain medication PRN  --Am labs  --Supportive care

## 2024-05-16 NOTE — CONSULTS
"Consultation - Urology  Pj Vences 48 y.o. male MRN: [id number]  Unit/Bed#: RM13 Encounter: [id number]    Assessment:  48 year old male with no reported PMH originally presented to the hospital with abdominal pain. Urology consulted for 7.0 cm R renal mass seen on imaging  Afebrile, vitals stable   No leukocytosis   Hgb 13.4  CMP unremarkable   Cr 0.67  UA positive for trace protein   CTAP: \"Findings suggestive of mild mid sigmoid colitis/diverticulitis versus early neoplasm as discussed above. Recommend follow-up colonoscopy when clinically appropriate/after resolution of acute illness. Extensive descending and sigmoid diverticulosis. No findings of bowel obstruction, appendicitis, free air, free fluid, or loculated fluid collections in the abdomen/pelvis. Large heterogeneously enhancing 7.0 cm right midpole renal mass containing thin calcifications noted, highly concerning for renal cell carcinoma.\"    Plan:  No urgent urology intervention indicated at this time. Discussed with patient and wife at bedside regarding CT findings. Patient will need outpatient follow up for further management of R renal mass. Referral placed.   Regular diet as tolerated  Analgesia and antiemetics prn   Rest of medical management per SLIM  Updated Dr. Alegre via TT    History of Present Illness   Chief Complaint: abdominal pain     HPI:  Pj Vences is a 48 y.o. male with no recorded past medical history who initially presented to [facility] ED with complaints of abdominal pain.  Patient primarily speaks [place]. The hospital , [facility], was used for this encounter. Patient reports his pain started approximately 3 days ago and has progressively worsened.  He reports associated nausea and vomiting.  He reports see streaks of blood in his stool. He also reports associated dizziness.  His pain worsened prompting his presentation to the emergency room yesterday.  He reports currently that he feels much better.  Reports minimal " abdominal pain.  No further episodes of nausea or vomiting.  His last bowel movement was yesterday morning.  He denies dysuria, hematuria, urgency.  However he does report frequency of urine having to urinate every 30 minutes.  Family history positive for his mother having ovarian cancer.  No family history of renal cell cancer.  No prior history of kidney stones.  He does not take any blood thinners. Discussed with patient and wife at bedside regarding CT findings. All questions were answered.     Inpatient consult to Urology  Consult performed by: Tracy Craft PA-C  Consult ordered by: Phillip Solomon PA-C          Review of Systems   Constitutional:  Positive for chills and fever. Negative for appetite change and unexpected weight change.   HENT:  Negative for congestion and sore throat.    Respiratory:  Negative for cough and shortness of breath.    Cardiovascular:  Negative for chest pain and leg swelling.   Gastrointestinal:  Positive for abdominal pain, blood in stool, nausea and vomiting. Negative for constipation.   Endocrine: Positive for polyuria. Negative for polydipsia.   Genitourinary:  Positive for frequency. Negative for difficulty urinating, dysuria, hematuria and urgency.   Musculoskeletal:  Positive for back pain. Negative for gait problem.   Skin:  Negative for pallor and wound.   Neurological:  Positive for dizziness. Negative for speech difficulty and light-headedness.       Historical Information   History reviewed. No pertinent past medical history.  History reviewed. No pertinent surgical history.  Social History   Social History     Substance and Sexual Activity   Alcohol Use Yes    Comment: occasionally     Social History     Substance and Sexual Activity   Drug Use Not Currently     E-Cigarette/Vaping    E-Cigarette Use Never User      E-Cigarette/Vaping Substances     Social History     Tobacco Use   Smoking Status Former   Smokeless Tobacco Never     Family History:  "non-contributory    Meds/Allergies   all current active meds have been reviewed and current meds:   Current Facility-Administered Medications   Medication Dose Route Frequency    acetaminophen (TYLENOL) tablet 650 mg  650 mg Oral Q6H PRN    [START ON 5/17/2024] cefTRIAXone (ROCEPHIN) IVPB (premix in dextrose) 1,000 mg 50 mL  1,000 mg Intravenous Q24H    heparin (porcine) subcutaneous injection 5,000 Units  5,000 Units Subcutaneous Q8H JENI    metroNIDAZOLE (FLAGYL) IVPB (premix) 500 mg 100 mL  500 mg Intravenous Q8H    ondansetron (ZOFRAN) injection 4 mg  4 mg Intravenous Q6H PRN    sodium chloride 0.9 % infusion  100 mL/hr Intravenous Continuous     No Known Allergies    Objective   First Vitals:   Blood Pressure: 138/87 (05/15/24 2117)  Pulse: 64 (05/15/24 2117)  Temperature: 97.5 °F (36.4 °C) (05/15/24 2119)  Temp Source: Temporal (05/15/24 2119)  Respirations: 18 (05/15/24 2119)  Height: 5' 6\" (167.6 cm) (05/15/24 2119)  Weight - Scale: 110 kg (242 lb 11.6 oz) (05/15/24 2119)  SpO2: 95 % (05/15/24 2117)    Current Vitals:   Blood Pressure: 147/85 (05/16/24 0817)  Pulse: 60 (05/16/24 0817)  Temperature: 97.6 °F (36.4 °C) (05/16/24 0817)  Temp Source: Temporal (05/16/24 0817)  Respirations: 18 (05/16/24 0817)  Height: 5' 6\" (167.6 cm) (05/15/24 2119)  Weight - Scale: 110 kg (242 lb 11.6 oz) (05/15/24 2119)  SpO2: 98 % (05/16/24 0817)      Intake/Output Summary (Last 24 hours) at 5/16/2024 1035  Last data filed at 5/16/2024 0445  Gross per 24 hour   Intake 1150 ml   Output --   Net 1150 ml       Invasive Devices       Peripheral Intravenous Line  Duration             Peripheral IV 05/15/24 Right Antecubital <1 day    Peripheral IV 05/16/24 Dorsal (posterior);Right Hand <1 day                    Physical Exam  Vitals reviewed.   Constitutional:       General: He is not in acute distress.     Appearance: He is obese. He is not ill-appearing.   HENT:      Head: Normocephalic and atraumatic.   Cardiovascular:      Rate " and Rhythm: Normal rate and regular rhythm.   Pulmonary:      Effort: Pulmonary effort is normal. No respiratory distress.   Abdominal:      General: Abdomen is flat. There is no distension.      Palpations: Abdomen is soft.      Tenderness: There is abdominal tenderness. There is no right CVA tenderness or guarding.   Musculoskeletal:      Right lower leg: No edema.      Left lower leg: No edema.   Skin:     General: Skin is warm and dry.   Neurological:      General: No focal deficit present.      Mental Status: He is alert. Mental status is at baseline.   Psychiatric:         Mood and Affect: Mood normal.         Behavior: Behavior normal.         Lab Results: I have personally reviewed pertinent lab results.  , CBC:   Lab Results   Component Value Date    WBC 8.34 05/16/2024    HGB 13.4 05/16/2024    HCT 41.2 05/16/2024    MCV 84 05/16/2024     05/16/2024    RBC 4.89 05/16/2024    MCH 27.4 05/16/2024    MCHC 32.5 05/16/2024    RDW 14.3 05/16/2024    MPV 11.1 05/16/2024    NRBC 0 05/16/2024   , CMP:   Lab Results   Component Value Date    SODIUM 140 05/16/2024    K 3.6 05/16/2024     05/16/2024    CO2 24 05/16/2024    BUN 13 05/16/2024    CREATININE 0.67 05/16/2024    CALCIUM 8.9 05/16/2024    AST 18 05/16/2024    ALT 31 05/16/2024    ALKPHOS 67 05/16/2024    EGFR 112 05/16/2024     Imaging: I have personally reviewed pertinent reports.    EKG, Pathology, and Other Studies: I have personally reviewed pertinent reports.        Code Status: Level 1 - Full Code  Advance Directive and Living Will:      Power of :    POLST:      Counseling / Coordination of Care  Total floor / unit time spent today 25 minutes.  Greater than 50% of total time was spent with the patient and / or family counseling and / or coordination of care.  A description of the counseling / coordination of care: evaluation of patient, review of diagnostic and laboratory studies and discussion with attending.      Tracy Craft  KAMILA

## 2024-05-16 NOTE — UTILIZATION REVIEW
Initial Clinical Review    Admission: Date/Time/Statement:   Admission Orders (From admission, onward)       Ordered        05/16/24 0130  INPATIENT ADMISSION  Once                          Orders Placed This Encounter   Procedures    INPATIENT ADMISSION     Standing Status:   Standing     Number of Occurrences:   1     Order Specific Question:   Level of Care     Answer:   Med Surg [16]     Order Specific Question:   Estimated length of stay     Answer:   More than 2 Midnights     Order Specific Question:   Certification     Answer:   I certify that inpatient services are medically necessary for this patient for a duration of greater than two midnights. See H&P and MD Progress Notes for additional information about the patient's course of treatment.     ED Arrival Information       Expected   -    Arrival   5/15/2024 20:45    Acuity   Urgent              Means of arrival   Walk-In    Escorted by   Family Member    Service   Hospitalist    Admission type   Emergency              Arrival complaint   abdominal pain/vomiting             Chief Complaint   Patient presents with    Vomiting     Patient started not feeling well for the past 3 days has nausea, vomiting, dizziness.        Initial Presentation: 48 y.o. male presents to ed from home on 5-15-24 for evaluation and treatment of abdominal pain, vomiting , dizziness, nausea for 3 days. PMHX: no pertinent history available.  Clinical assessment signicant for abdominal tenderness. Patient reports pain 8/10.  Imaging shows sigmoid colitis/ diverticulitis vs early neoplasm.  Large heterogeneously enhancing R renal mass concerning for renal cell carcinoma.  EKG: PACs.  Initially treated with iv .9% ns 1L bolus, iv zofran x1, iv protonix x1, iv ceftriaxone x 1, iv flagyl x1, iv .9% ns 100/hr, sq heparin.  Admit to inpatient med surg for acute diverticulitis and possible renal cell cancer.  Plan includes: iv ceftriaxone and iv flagyl,  iv fluids .9% ns 100/hr. GI  consult, Urology consult, possible renal biopsy.     Anticipated Length of Stay/Certification Statement:  Patient will be admitted on an inpatient basis with an anticipated length of stay of greater than 2 midnights secondary to acute diverticulitis, right renal mass, abdominal pain.     Date: 5-16-24    Day 2: inpatient med surg   No urgent Urology intervention. CMP unremarkable. UA trace protein.  Follow up as outpatient. PT/OT evaluations- no rehab needs.  Continue iv fluids 100/hr, iv ceftriaxone and iv flagyl.     ED Triage Vitals   05/15/24 2119 05/15/24 2117 05/15/24 2119 05/15/24 2117 05/15/24 2117   97.5 °F (36.4 °C) 64 18 138/87 95 %      Temporal Monitor         Pain Score       8          05/16/24 110 kg (241 lb 12.8 oz)     Additional Vital Signs:     Date/Time Temp Pulse Resp BP MAP (mmHg) SpO2 O2 Device   05/16/24 15:12:31 98.4 °F (36.9 °C) 55 18 139/90 106 95 % --   05/16/24 12:42:57 98.4 °F (36.9 °C) 61 16 135/90 105 95 % None (Room air)   05/16/24 0817 97.6 °F (36.4 °C) 60 18 147/85 111 98 % None (Room air)   05/16/24 0100 -- 74 20 142/85 110 98 % None (Room air)   05/16/24 0030 -- 59 18 143/87 110 96 % None (Room air)   05/16/24 0000 -- 63 18 152/87 112 95 % None (Room air)   05/15/24 2232 -- 70 -- 151/93 117 98 % --   05/15/24 2222 -- -- 21 -- -- -- --   05/15/24 2119 97.5 °F (36.4 °C) -- 18 -- -- -- --   05/15/24 2117 -- 64 -- 138/87 108 95 % --           Pertinent Labs/Diagnostic Test Results:     CT abdomen pelvis with contrast   Final  (05/16 0053)      1.  Findings suggestive of mild mid sigmoid colitis/diverticulitis versus early neoplasm as discussed above. Recommend follow-up colonoscopy when clinically appropriate/after resolution of acute illness. Extensive descending and sigmoid diverticulosis.    No findings of bowel obstruction, appendicitis, free air, free fluid, or loculated fluid collections in the abdomen/pelvis.   2.  Large heterogeneously enhancing 7.0 cm right midpole renal  mass containing thin calcifications noted, highly concerning for renal cell carcinoma. Recommend nonemergent urology consultation.            Results from last 7 days   Lab Units 05/16/24  0420 05/16/24  0212 05/15/24  2204   WBC Thousand/uL 8.34  --  8.71   HEMOGLOBIN g/dL 13.4  --  13.8   HEMATOCRIT % 41.2  --  42.4   PLATELETS Thousands/uL 218 214 223   TOTAL NEUT ABS Thousands/µL 5.69  --  6.16         Results from last 7 days   Lab Units 05/16/24  0420 05/15/24  2204   SODIUM mmol/L 140 142   POTASSIUM mmol/L 3.6 3.6   CHLORIDE mmol/L 107 105   CO2 mmol/L 24 27   ANION GAP mmol/L 9 10   BUN mg/dL 13 18   CREATININE mg/dL 0.67 0.81   EGFR ml/min/1.73sq m 112 104   CALCIUM mg/dL 8.9 9.4   MAGNESIUM mg/dL 2.3  --    PHOSPHORUS mg/dL 3.3  --      Results from last 7 days   Lab Units 05/16/24  0420 05/15/24  2204   AST U/L 18 23   ALT U/L 31 37   ALK PHOS U/L 67 82   TOTAL PROTEIN g/dL 6.7 7.7   ALBUMIN g/dL 4.1 4.7   TOTAL BILIRUBIN mg/dL 0.41 0.32         Results from last 7 days   Lab Units 05/16/24  0420 05/15/24  2204   GLUCOSE RANDOM mg/dL 117 132       Results from last 7 days   Lab Units 05/16/24  0212 05/15/24  2355 05/15/24  2204   HS TNI 0HR ng/L  --   --  3   HS TNI 2HR ng/L  --  5  --    HSTNI D2 ng/L  --  2  --    HS TNI 4HR ng/L 4  --   --    HSTNI D4 ng/L 1  --   --      Results from last 7 days   Lab Units 05/16/24 0420   PROCALCITONIN ng/ml <0.05     Results from last 7 days   Lab Units 05/15/24  2204   LIPASE u/L 8*     Results from last 7 days   Lab Units 05/15/24  2300   CLARITY UA  Clear   COLOR UA  Yellow   SPEC GRAV UA  1.025   PH UA  6.5   GLUCOSE UA mg/dl Negative   KETONES UA mg/dl Negative   BLOOD UA  Negative   PROTEIN UA mg/dl Trace*   NITRITE UA  Negative   BILIRUBIN UA  Negative   UROBILINOGEN UA (BE) mg/dl <2.0   LEUKOCYTES UA  Negative   WBC UA /hpf None Seen   RBC UA /hpf None Seen   BACTERIA UA /hpf None Seen   EPITHELIAL CELLS WET PREP /hpf Occasional   MUCUS THREADS   Occasional*         ED Treatment:   Medication Administration from 05/15/2024 2045 to 05/16/2024 1237         Date/Time Order Dose Route Action     05/15/2024 2217 EDT sodium chloride 0.9 % bolus 1,000 mL 1,000 mL Intravenous New Bag     05/15/2024 2217 EDT ondansetron (ZOFRAN) injection 4 mg 4 mg Intravenous Given     05/15/2024 2221 EDT aluminum-magnesium hydroxide-simethicone (MAALOX) oral suspension 30 mL 30 mL Oral Given     05/15/2024 2218 EDT pantoprazole (PROTONIX) injection 40 mg 40 mg Intravenous Given     05/16/2024 0212 EDT cefTRIAXone (ROCEPHIN) IVPB (premix in dextrose) 1,000 mg 50 mL 1,000 mg Intravenous New Bag     05/16/2024 0817 EDT metroNIDAZOLE (FLAGYL) IVPB (premix) 500 mg 100 mL 500 mg Intravenous New Bag     05/16/2024 0301 EDT metroNIDAZOLE (FLAGYL) IVPB (premix) 500 mg 100 mL 500 mg Intravenous New Bag     05/16/2024 0212 EDT sodium chloride 0.9 % infusion 100 mL/hr Intravenous New Bag     05/16/2024 0601 EDT heparin (porcine) subcutaneous injection 5,000 Units 5,000 Units Subcutaneous Given          History reviewed. No pertinent past medical history.    Present on Admission:   Acute diverticulitis   Right renal mass   Left lower quadrant abdominal pain      Admitting Diagnosis:     Diverticulitis [K57.92]  Vomiting [R11.10]  Abdominal pain [R10.9]  Nausea and vomiting [R11.2]  Renal mass [N28.89]  Right renal mass [N28.89]    Age/Sex: 48 y.o. male    Scheduled Medications:    [START ON 5/17/2024] cefTRIAXone, 1,000 mg, Intravenous, Q24H  heparin (porcine), 5,000 Units, Subcutaneous, Q8H JENI  metroNIDAZOLE, 500 mg, Intravenous, Q8H      Continuous IV Infusions:  sodium chloride, 100 mL/hr, Intravenous, Continuous      PRN Meds:  acetaminophen, 650 mg, Oral, Q6H PRN  ondansetron, 4 mg, Intravenous, Q6H PRN        IP CONSULT TO UROLOGY  IP CONSULT TO GASTROENTEROLOGY    Network Utilization Review Department  ATTENTION: Please call with any questions or concerns to [phone] and carefully  listen to the prompts so that you are directed to the right person. All voicemails are confidential.   For Discharge needs, contact Care Management DC Support Team at [phone] opt. [phone]  Send all requests for admission clinical reviews, approved or denied determinations and any other requests to dedicated fax number below belonging to the campus where the patient is receiving treatment. List of dedicated fax numbers for the Facilities:  FACILITY NAME UR FAX NUMBER   ADMISSION DENIALS (Administrative/Medical Necessity) [phone]   DISCHARGE SUPPORT TEAM (NETWORK) [phone]   PARENT CHILD HEALTH (Maternity/NICU/Pediatrics) [phone]   [facility] [phone]   [facility] [phone]   [facility] [phone]   [facility] [phone]   [facility] [phone]   [facility] [phone]   [facility] [phone]   [facility] [phone]   [facility] [phone]   On license of [facility] [phone]   [facility] [phone]   [facility] [phone]

## 2024-05-17 ENCOUNTER — TRANSITIONAL CARE MANAGEMENT (OUTPATIENT)
Dept: FAMILY MEDICINE CLINIC | Facility: CLINIC | Age: 49
End: 2024-05-17

## 2024-05-17 ENCOUNTER — TELEPHONE (OUTPATIENT)
Age: 49
End: 2024-05-17

## 2024-05-17 VITALS
OXYGEN SATURATION: 96 % | HEART RATE: 58 BPM | TEMPERATURE: 98.5 F | RESPIRATION RATE: 17 BRPM | HEIGHT: 66 IN | DIASTOLIC BLOOD PRESSURE: 90 MMHG | WEIGHT: 240.2 LBS | SYSTOLIC BLOOD PRESSURE: 149 MMHG | BODY MASS INDEX: 38.6 KG/M2

## 2024-05-17 LAB
ANION GAP SERPL CALCULATED.3IONS-SCNC: 8 MMOL/L (ref 4–13)
BASOPHILS # BLD AUTO: 0.04 THOUSANDS/ÂΜL (ref 0–0.1)
BASOPHILS NFR BLD AUTO: 1 % (ref 0–1)
BUN SERPL-MCNC: 14 MG/DL (ref 5–25)
CALCIUM SERPL-MCNC: 8.9 MG/DL (ref 8.4–10.2)
CHLORIDE SERPL-SCNC: 105 MMOL/L (ref 96–108)
CO2 SERPL-SCNC: 26 MMOL/L (ref 21–32)
CREAT SERPL-MCNC: 0.81 MG/DL (ref 0.6–1.3)
EOSINOPHIL # BLD AUTO: 0.39 THOUSAND/ÂΜL (ref 0–0.61)
EOSINOPHIL NFR BLD AUTO: 5 % (ref 0–6)
ERYTHROCYTE [DISTWIDTH] IN BLOOD BY AUTOMATED COUNT: 14.2 % (ref 11.6–15.1)
GFR SERPL CREATININE-BSD FRML MDRD: 104 ML/MIN/1.73SQ M
GLUCOSE SERPL-MCNC: 103 MG/DL (ref 65–140)
HCT VFR BLD AUTO: 40.4 % (ref 36.5–49.3)
HGB BLD-MCNC: 13.4 G/DL (ref 12–17)
IMM GRANULOCYTES # BLD AUTO: 0.03 THOUSAND/UL (ref 0–0.2)
IMM GRANULOCYTES NFR BLD AUTO: 0 % (ref 0–2)
LYMPHOCYTES # BLD AUTO: 2.73 THOUSANDS/ÂΜL (ref 0.6–4.47)
LYMPHOCYTES NFR BLD AUTO: 37 % (ref 14–44)
MCH RBC QN AUTO: 28 PG (ref 26.8–34.3)
MCHC RBC AUTO-ENTMCNC: 33.2 G/DL (ref 31.4–37.4)
MCV RBC AUTO: 84 FL (ref 82–98)
MONOCYTES # BLD AUTO: 0.5 THOUSAND/ÂΜL (ref 0.17–1.22)
MONOCYTES NFR BLD AUTO: 7 % (ref 4–12)
NEUTROPHILS # BLD AUTO: 3.66 THOUSANDS/ÂΜL (ref 1.85–7.62)
NEUTS SEG NFR BLD AUTO: 50 % (ref 43–75)
NRBC BLD AUTO-RTO: 0 /100 WBCS
PLATELET # BLD AUTO: 216 THOUSANDS/UL (ref 149–390)
PMV BLD AUTO: 11.5 FL (ref 8.9–12.7)
POTASSIUM SERPL-SCNC: 3.6 MMOL/L (ref 3.5–5.3)
RBC # BLD AUTO: 4.79 MILLION/UL (ref 3.88–5.62)
SODIUM SERPL-SCNC: 139 MMOL/L (ref 135–147)
WBC # BLD AUTO: 7.35 THOUSAND/UL (ref 4.31–10.16)

## 2024-05-17 PROCEDURE — 80048 BASIC METABOLIC PNL TOTAL CA: CPT | Performed by: FAMILY MEDICINE

## 2024-05-17 PROCEDURE — 99254 IP/OBS CNSLTJ NEW/EST MOD 60: CPT | Performed by: STUDENT IN AN ORGANIZED HEALTH CARE EDUCATION/TRAINING PROGRAM

## 2024-05-17 PROCEDURE — 99239 HOSP IP/OBS DSCHRG MGMT >30: CPT | Performed by: FAMILY MEDICINE

## 2024-05-17 PROCEDURE — 85025 COMPLETE CBC W/AUTO DIFF WBC: CPT | Performed by: FAMILY MEDICINE

## 2024-05-17 RX ORDER — METRONIDAZOLE 500 MG/1
500 TABLET ORAL EVERY 8 HOURS SCHEDULED
Qty: 24 TABLET | Refills: 0 | Status: SHIPPED | OUTPATIENT
Start: 2024-05-18 | End: 2024-05-26

## 2024-05-17 RX ORDER — CEFUROXIME AXETIL 500 MG/1
500 TABLET ORAL EVERY 12 HOURS SCHEDULED
Qty: 16 TABLET | Refills: 0 | Status: SHIPPED | OUTPATIENT
Start: 2024-05-18 | End: 2024-05-26

## 2024-05-17 RX ADMIN — CEFTRIAXONE 1000 MG: 1 INJECTION, SOLUTION INTRAVENOUS at 01:43

## 2024-05-17 RX ADMIN — METRONIDAZOLE 500 MG: 500 INJECTION, SOLUTION INTRAVENOUS at 01:08

## 2024-05-17 RX ADMIN — HEPARIN SODIUM 5000 UNITS: 5000 INJECTION, SOLUTION INTRAVENOUS; SUBCUTANEOUS at 05:38

## 2024-05-17 RX ADMIN — METRONIDAZOLE 500 MG: 500 INJECTION, SOLUTION INTRAVENOUS at 09:23

## 2024-05-17 RX ADMIN — SODIUM CHLORIDE 100 ML/HR: 0.9 INJECTION, SOLUTION INTRAVENOUS at 04:30

## 2024-05-17 NOTE — DISCHARGE SUMMARY
[facility]  Discharge- Pj Vences 1975, 48 y.o. male MRN: [id number]  Unit/Bed#: 409-01 Encounter: [id number]  Primary Care Provider: Amaris Junior PA-C   Date and time admitted to hospital: 5/15/2024  9:13 PM    * Acute diverticulitis  Assessment & Plan  Presented  to the ER for evaluation of complaint of left lower quadrant abdominal pain x3 days with N/V  CT abdomen/pelvis showed findings suggestive of mild mid sigmoid colitis/diverticulitis versus early neoplasm  Given IVF, pain regime PRN  Advanced diet as tolerated, LLQ pain has resolved  Continue IV Flagyl and IV ceftriaxone, transition to PO ceftin and flagyl to complete 10 day course of treatment  Follow up with outpatient GI for colonoscopy    Right renal mass  Assessment & Plan  CT abdomen/pelvis showed large heterogeneously enhancing 7.0 cm right midpole renal mass containing thin calcifications noted, highly concerning for renal cell carcinoma. Recommend nonemergent urology consultation.   Urology consulted while inpatient, recommend no immediate intervention  Will follow-up with outpatient urology for evaluation of renal mass        Discharging Physician / Practitioner: Michelle Bai MD  PCP: Amaris Junior PA-C  Admission Date:   Admission Orders (From admission, onward)       Ordered        05/16/24 0130  INPATIENT ADMISSION  Once                          Discharge Date: 05/17/24    Medical Problems       Resolved Problems  Date Reviewed: 5/16/2024   None         Consultations During Hospital Stay:  Urology  GI    Procedures Performed:     CT abdomen pelvis with contrast   Final Result      1.  Findings suggestive of mild mid sigmoid colitis/diverticulitis versus early neoplasm as discussed above. Recommend follow-up colonoscopy when clinically appropriate/after resolution of acute illness. Extensive descending and sigmoid diverticulosis.    No findings of bowel obstruction, appendicitis, free air, free fluid, or  loculated fluid collections in the abdomen/pelvis.   2.  Large heterogeneously enhancing 7.0 cm right midpole renal mass containing thin calcifications noted, highly concerning for renal cell carcinoma. Recommend nonemergent urology consultation.      Workstation performed: [id number]               Significant Findings / Test Results:   Results for orders placed or performed during the hospital encounter of 05/15/24   CBC and differential   Result Value Ref Range    WBC 8.71 4.31 - 10.16 Thousand/uL    RBC 5.00 3.88 - 5.62 Million/uL    Hemoglobin 13.8 12.0 - 17.0 g/dL    Hematocrit 42.4 36.5 - 49.3 %    MCV 85 82 - 98 fL    MCH 27.6 26.8 - 34.3 pg    MCHC 32.5 31.4 - 37.4 g/dL    RDW 14.2 11.6 - 15.1 %    MPV 11.5 8.9 - 12.7 fL    Platelets 223 149 - 390 Thousands/uL    nRBC 0 /100 WBCs    Segmented % 70 43 - 75 %    Immature Grans % 1 0 - 2 %    Lymphocytes % 21 14 - 44 %    Monocytes % 5 4 - 12 %    Eosinophils Relative 2 0 - 6 %    Basophils Relative 1 0 - 1 %    Absolute Neutrophils 6.16 1.85 - 7.62 Thousands/µL    Absolute Immature Grans 0.04 0.00 - 0.20 Thousand/uL    Absolute Lymphocytes 1.83 0.60 - 4.47 Thousands/µL    Absolute Monocytes 0.43 0.17 - 1.22 Thousand/µL    Eosinophils Absolute 0.21 0.00 - 0.61 Thousand/µL    Basophils Absolute 0.04 0.00 - 0.10 Thousands/µL   Comprehensive metabolic panel   Result Value Ref Range    Sodium 142 135 - 147 mmol/L    Potassium 3.6 3.5 - 5.3 mmol/L    Chloride 105 96 - 108 mmol/L    CO2 27 21 - 32 mmol/L    ANION GAP 10 4 - 13 mmol/L    BUN 18 5 - 25 mg/dL    Creatinine 0.81 0.60 - 1.30 mg/dL    Glucose 132 65 - 140 mg/dL    Calcium 9.4 8.4 - 10.2 mg/dL    AST 23 13 - 39 U/L    ALT 37 7 - 52 U/L    Alkaline Phosphatase 82 34 - 104 U/L    Total Protein 7.7 6.4 - 8.4 g/dL    Albumin 4.7 3.5 - 5.0 g/dL    Total Bilirubin 0.32 0.20 - 1.00 mg/dL    eGFR 104 ml/min/1.73sq m   Lipase   Result Value Ref Range    Lipase 8 (L) 11 - 82 u/L   UA w Reflex to Microscopic w Reflex  "to Culture    Specimen: Urine, Clean Catch   Result Value Ref Range    Color, UA Yellow     Clarity, UA Clear     Specific Gravity, UA 1.025 1.005 - 1.030    pH, UA 6.5 4.5, 5.0, 5.5, 6.0, 6.5, 7.0, 7.5, 8.0    Leukocytes, UA Negative Negative    Nitrite, UA Negative Negative    Protein, UA Trace (A) Negative mg/dl    Glucose, UA Negative Negative mg/dl    Ketones, UA Negative Negative mg/dl    Urobilinogen, UA <2.0 <2.0 mg/dl mg/dl    Bilirubin, UA Negative Negative    Occult Blood, UA Negative Negative   HS Troponin 0hr (reflex protocol)   Result Value Ref Range    hs TnI 0hr 3 \"Refer to ACS Flowchart\"- see link ng/L   HS Troponin I 2hr   Result Value Ref Range    hs TnI 2hr 5 \"Refer to ACS Flowchart\"- see link ng/L    Delta 2hr hsTnI 2 <20 ng/L   Urine Microscopic   Result Value Ref Range    RBC, UA None Seen None Seen, 0-1, 1-2, 2-4, 0-5 /hpf    WBC, UA None Seen None Seen, 0-1, 1-2, 0-5, 2-4 /hpf    Epithelial Cells Occasional None Seen, Occasional /hpf    Bacteria, UA None Seen None Seen, Occasional /hpf    MUCUS THREADS Occasional (A) None Seen   HS Troponin I 4hr   Result Value Ref Range    hs TnI 4hr 4 \"Refer to ACS Flowchart\"- see link ng/L    Delta 4hr hsTnI 1 <20 ng/L   Platelet count   Result Value Ref Range    Platelets 214 149 - 390 Thousands/uL    MPV 11.0 8.9 - 12.7 fL   Comprehensive metabolic panel   Result Value Ref Range    Sodium 140 135 - 147 mmol/L    Potassium 3.6 3.5 - 5.3 mmol/L    Chloride 107 96 - 108 mmol/L    CO2 24 21 - 32 mmol/L    ANION GAP 9 4 - 13 mmol/L    BUN 13 5 - 25 mg/dL    Creatinine 0.67 0.60 - 1.30 mg/dL    Glucose 117 65 - 140 mg/dL    Calcium 8.9 8.4 - 10.2 mg/dL    AST 18 13 - 39 U/L    ALT 31 7 - 52 U/L    Alkaline Phosphatase 67 34 - 104 U/L    Total Protein 6.7 6.4 - 8.4 g/dL    Albumin 4.1 3.5 - 5.0 g/dL    Total Bilirubin 0.41 0.20 - 1.00 mg/dL    eGFR 112 ml/min/1.73sq m   Magnesium   Result Value Ref Range    Magnesium 2.3 1.9 - 2.7 mg/dL   Phosphorus   Result " Value Ref Range    Phosphorus 3.3 2.7 - 4.5 mg/dL   CBC and differential   Result Value Ref Range    WBC 8.34 4.31 - 10.16 Thousand/uL    RBC 4.89 3.88 - 5.62 Million/uL    Hemoglobin 13.4 12.0 - 17.0 g/dL    Hematocrit 41.2 36.5 - 49.3 %    MCV 84 82 - 98 fL    MCH 27.4 26.8 - 34.3 pg    MCHC 32.5 31.4 - 37.4 g/dL    RDW 14.3 11.6 - 15.1 %    MPV 11.1 8.9 - 12.7 fL    Platelets 218 149 - 390 Thousands/uL    nRBC 0 /100 WBCs    Segmented % 67 43 - 75 %    Immature Grans % 0 0 - 2 %    Lymphocytes % 25 14 - 44 %    Monocytes % 6 4 - 12 %    Eosinophils Relative 1 0 - 6 %    Basophils Relative 1 0 - 1 %    Absolute Neutrophils 5.69 1.85 - 7.62 Thousands/µL    Absolute Immature Grans 0.02 0.00 - 0.20 Thousand/uL    Absolute Lymphocytes 2.04 0.60 - 4.47 Thousands/µL    Absolute Monocytes 0.48 0.17 - 1.22 Thousand/µL    Eosinophils Absolute 0.07 0.00 - 0.61 Thousand/µL    Basophils Absolute 0.04 0.00 - 0.10 Thousands/µL   Procalcitonin   Result Value Ref Range    Procalcitonin <0.05 <=0.25 ng/ml   CBC and differential   Result Value Ref Range    WBC 7.35 4.31 - 10.16 Thousand/uL    RBC 4.79 3.88 - 5.62 Million/uL    Hemoglobin 13.4 12.0 - 17.0 g/dL    Hematocrit 40.4 36.5 - 49.3 %    MCV 84 82 - 98 fL    MCH 28.0 26.8 - 34.3 pg    MCHC 33.2 31.4 - 37.4 g/dL    RDW 14.2 11.6 - 15.1 %    MPV 11.5 8.9 - 12.7 fL    Platelets 216 149 - 390 Thousands/uL    nRBC 0 /100 WBCs    Segmented % 50 43 - 75 %    Immature Grans % 0 0 - 2 %    Lymphocytes % 37 14 - 44 %    Monocytes % 7 4 - 12 %    Eosinophils Relative 5 0 - 6 %    Basophils Relative 1 0 - 1 %    Absolute Neutrophils 3.66 1.85 - 7.62 Thousands/µL    Absolute Immature Grans 0.03 0.00 - 0.20 Thousand/uL    Absolute Lymphocytes 2.73 0.60 - 4.47 Thousands/µL    Absolute Monocytes 0.50 0.17 - 1.22 Thousand/µL    Eosinophils Absolute 0.39 0.00 - 0.61 Thousand/µL    Basophils Absolute 0.04 0.00 - 0.10 Thousands/µL   Basic metabolic panel   Result Value Ref Range    Sodium 139  135 - 147 mmol/L    Potassium 3.6 3.5 - 5.3 mmol/L    Chloride 105 96 - 108 mmol/L    CO2 26 21 - 32 mmol/L    ANION GAP 8 4 - 13 mmol/L    BUN 14 5 - 25 mg/dL    Creatinine 0.81 0.60 - 1.30 mg/dL    Glucose 103 65 - 140 mg/dL    Calcium 8.9 8.4 - 10.2 mg/dL    eGFR 104 ml/min/1.73sq m   ECG 12 lead   Result Value Ref Range    Ventricular Rate 84 BPM    Atrial Rate 84 BPM    AK Interval 164 ms    QRSD Interval 96 ms    QT Interval 394 ms    QTC Interval 465 ms    P Axis 47 degrees    QRS Axis 52 degrees    T Wave Axis 33 degrees         Incidental Findings:   CT abdomen/pelvis: Large heterogeneously enhancing 7.0 cm right midpole renal mass containing thin calcifications noted, highly concerning for renal cell carcinoma. Recommend nonemergent urology consultation.     Test Results Pending at Discharge (will require follow up):   none     Outpatient Tests Requested:  none    Complications:  none    Reason for Admission: LLQ pain    Hospital Course:     Pj Vences is a 48 y.o. male patient who originally presented to the hospital on 5/15/2024 due to abdominal pain associated with nausea and vomiting for x3 days. CT abdomen/pelvis showed mild mid sigmoid colitis/diverticulitis vs early neoplasm with extensive descending and sigmoid diverticulosis. Additionally, it showed large heterogeneously enhancing 7.0 cm right midpole renal mass containing thin calcifications noted, highly concerning for renal cell carcinoma. The patient was admitted for treatment of diverticulitis and started on IV ceftriaxone and IV Flagyl. Urology was consulted who recommended no acute intervention. Gastroenterology was consulted who recommended transition to p.o. antibiotics and advancement of diet as tolerated. He showed gradual movement and clinical condition with resolution of left lower quadrant pain and advancement to solid food he was discharged in stable condition with prescriptions for p.o. Ceftin and Flagyl to complete a 10-day  "course of antibiotic treatment and will follow-up with urology for evaluation of renal mass and GI in 6 to 8 weeks for colonoscopy.    Please see above list of diagnoses and related plan for additional information.     HPI per Admission:     Pj Vences is a 48 y.o. male with no documented significant PMH who presents to the emergency room for evaluation of complaint of abdominal pain.  Abdominal pain associated with nausea and vomiting.  Patient reports that his pain started about 3 days ago getting progressively worse.  Denies having any chest pain, as of breath, cough, fever, chills, urinary symptoms, hematuria.  Patient denies similar pain in the past.     Workup in the emergency room included labs otherwise within normal limits, urine negative for acute UTI, microscopic hematuria.  CT abdomen and pelvis completed with results as shown below.  EKG shows a normal sinus rhythm at a rate of 84 bpm.  In the ER patient given Maalox 30 mL p.o., Protonix 40 mg IV, normal saline 1 L, Zofran 4 mg IV, ceftriaxone 1 g IV, Flagyl 500 mg IV.     Patient is being admitted on inpatient status [facility] for further workup and management of acute diverticulitis, right renal mass, abdominal pain.      Condition at Discharge: fair     Discharge Day Visit / Exam:     Subjective:  Patient seen and examined at bedside this morning. No acute events overnight. Reports he is able to tolerate solid food without issue, has not yet had a bowel movement. States LLQ pain has resolved.     Vitals: Blood Pressure: 149/90 (05/17/24 0724)  Pulse: 58 (05/17/24 0724)  Temperature: 98.5 °F (36.9 °C) (05/17/24 0724)  Temp Source: Oral (05/17/24 0724)  Respirations: 17 (05/17/24 0724)  Height: 5' 6\" (167.6 cm) (05/16/24 1249)  Weight - Scale: 109 kg (240 lb 3.2 oz) (05/17/24 0600)  SpO2: 96 % (05/17/24 0724)    Exam:   Physical Exam  Constitutional:       General: He is not in acute distress.     Appearance: He is obese.   HENT:      Head: " Normocephalic and atraumatic.   Cardiovascular:      Rate and Rhythm: Normal rate and regular rhythm.      Pulses: Normal pulses.      Heart sounds: Normal heart sounds.   Pulmonary:      Effort: Pulmonary effort is normal.      Breath sounds: Normal breath sounds.   Abdominal:      General: Bowel sounds are normal. There is no distension.      Tenderness: There is no abdominal tenderness.   Musculoskeletal:      Cervical back: Normal range of motion and neck supple.   Skin:     General: Skin is warm and dry.   Neurological:      General: No focal deficit present.      Mental Status: He is alert and oriented to person, place, and time.         Discussion with Family: yes, family updated at bedside    Discharge instructions/Information to patient and family:   See after visit summary for information provided to patient and family.      Provisions for Follow-Up Care:  See after visit summary for information related to follow-up care and any pertinent home health orders.      Disposition:     Home    Planned Readmission: no     Discharge Statement:  I spent 90 minutes discharging the patient. This time was spent on the day of discharge. I had direct contact with the patient on the day of discharge. Greater than 50% of the total time was spent examining patient, answering all patient questions, arranging and discussing plan of care with patient as well as directly providing post-discharge instructions.  Additional time then spent on discharge activities.    Discharge Medications:  See after visit summary for reconciled discharge medications provided to patient and family.      ** Please Note: This note has been constructed using a voice recognition system **

## 2024-05-17 NOTE — INCIDENTAL FINDINGS
The following findings require follow up:  Radiographic finding   Finding: Large heterogeneously enhancing 7.0 cm right midpole renal mass containing thin calcifications noted, highly concerning for renal cell carcinoma.      Follow up required: Recommend nonemergent urology consultation.    Follow up should be done within 2-4 week(s)    Please notify the following clinician to assist with the follow up:   Dr. Alegre

## 2024-05-17 NOTE — TELEPHONE ENCOUNTER
Pt's son and wife stopped in office requesting appt. Unfortunately no reasonable time frame found for renal mass appt.    Please assist with finding appt with Robotic surgeon in [place] or [place].    Best call back number for son is [phone]

## 2024-05-17 NOTE — ASSESSMENT & PLAN NOTE
Presented  to the ER for evaluation of complaint of left lower quadrant abdominal pain x3 days with N/V  CT abdomen/pelvis showed findings suggestive of mild mid sigmoid colitis/diverticulitis versus early neoplasm  Given IVF, pain regime PRN  Advanced diet as tolerated, LLQ pain has resolved  Continue IV Flagyl and IV ceftriaxone, transition to PO ceftin and flagyl to complete 10 day course of treatment  Follow up with outpatient GI for colonoscopy

## 2024-05-17 NOTE — PLAN OF CARE
Problem: Potential for Falls  Goal: Patient will remain free of falls  Description: INTERVENTIONS:  - Educate patient/family on patient safety including physical limitations  - Instruct patient to call for assistance with activity   - Consult OT/PT to assist with strengthening/mobility   - Keep Call bell within reach  - Keep bed low and locked with side rails adjusted as appropriate  - Keep care items and personal belongings within reach  - Initiate and maintain comfort rounds  - Make Fall Risk Sign visible to staff  - Offer Toileting every 2 Hours, in advance of need  - Initiate/Maintain bed/chair alarm  - Obtain necessary fall risk management equipment: non skid socks, call bell in reach   - Apply yellow socks and bracelet for high fall risk patients  - Consider moving patient to room near nurses station  Outcome: Progressing     Problem: Prexisting or High Potential for Compromised Skin Integrity  Goal: Skin integrity is maintained or improved  Description: INTERVENTIONS:  - Identify patients at risk for skin breakdown  - Assess and monitor skin integrity  - Assess and monitor nutrition and hydration status  - Monitor labs   - Assess for incontinence   - Turn and reposition patient  - Assist with mobility/ambulation  - Relieve pressure over bony prominences  - Avoid friction and shearing  - Provide appropriate hygiene as needed including keeping skin clean and dry  - Evaluate need for skin moisturizer/barrier cream  - Collaborate with interdisciplinary team   - Patient/family teaching  - Consider wound care consult   Outcome: Progressing     Problem: PAIN - ADULT  Goal: Verbalizes/displays adequate comfort level or baseline comfort level  Description: Interventions:  - Encourage patient to monitor pain and request assistance  - Assess pain using appropriate pain scale  - Administer analgesics based on type and severity of pain and evaluate response  - Implement non-pharmacological measures as appropriate and  evaluate response  - Consider cultural and social influences on pain and pain management  - Notify physician/advanced practitioner if interventions unsuccessful or patient reports new pain  Outcome: Progressing

## 2024-05-17 NOTE — TELEPHONE ENCOUNTER
Call placed to patient's son. He did not answer. LMOM asking for call back to schedule appointment.     Pt can be offered 5- with Dr. Corea OV LONG spot for 30 minute discussion if he calls back

## 2024-05-17 NOTE — NURSING NOTE
AVS reviewed with patient and family.  All questions and concerns answered. All belongings taken with patient. Patient left the unit in stable condition, walked down to private car accompanied by family.

## 2024-05-17 NOTE — ASSESSMENT & PLAN NOTE
CT abdomen/pelvis showed large heterogeneously enhancing 7.0 cm right midpole renal mass containing thin calcifications noted, highly concerning for renal cell carcinoma. Recommend nonemergent urology consultation.   Urology consulted while inpatient, recommend no immediate intervention  Will follow-up with outpatient urology

## 2024-05-17 NOTE — TELEPHONE ENCOUNTER
Pt transferred to myself by Aaron.     Pt post ED admit 5/16 - 5/17 for abdominal pain, nausea, vomiting. Ct revealed acute diverticulitis. Pt discharged on 10 day course of Ceftin and Flagyl. Follow up appointments unavailable until summer months. Pt scheduled in urgent slot with BORIS Pablo. Pt requested to be transferred to urology to schedule an OV. Warm transferred to Mandy @ [place] office.

## 2024-05-17 NOTE — CASE MANAGEMENT
Case Management Discharge Planning Note    Patient name Pj Vences  Location /409-01 MRN [id number]  : 1975 Date 2024       Current Admission Date: 5/15/2024  Current Admission Diagnosis:Acute diverticulitis   Patient Active Problem List    Diagnosis Date Noted Date Diagnosed    Acute diverticulitis 2024     Right renal mass 2024     Left lower quadrant abdominal pain 2024       LOS (days): 1  Geometric Mean LOS (GMLOS) (days):   Days to GMLOS:     OBJECTIVE:  Risk of Unplanned Readmission Score: 7.84         Current admission status: Inpatient   Preferred Pharmacy:   [id number] [place] [facility] [address]  [address] [address]  Phone: [phone] Fax: [phone]    Primary Care Provider: Amaris Junior PA-C    Primary Insurance: MATT NEFF PENDING  Secondary Insurance:     DISCHARGE DETAILS:patient discharging home today. Nurse to review AVS, all follow up providers listed.

## 2024-05-17 NOTE — PLAN OF CARE
Problem: Potential for Falls  Goal: Patient will remain free of falls  Description: INTERVENTIONS:  - Educate patient/family on patient safety including physical limitations  - Instruct patient to call for assistance with activity   - Consult OT/PT to assist with strengthening/mobility   - Keep Call bell within reach  - Keep bed low and locked with side rails adjusted as appropriate  - Keep care items and personal belongings within reach  - Initiate and maintain comfort rounds  - Make Fall Risk Sign visible to staff  - Offer Toileting every 2 Hours, in advance of need  - Initiate/Maintain bed/chair alarm  - Obtain necessary fall risk management equipment: non skid socks, call bell in reach   - Apply yellow socks and bracelet for high fall risk patients  - Consider moving patient to room near nurses station  Outcome: Progressing     Problem: Prexisting or High Potential for Compromised Skin Integrity  Goal: Skin integrity is maintained or improved  Description: INTERVENTIONS:  - Identify patients at risk for skin breakdown  - Assess and monitor skin integrity  - Assess and monitor nutrition and hydration status  - Monitor labs   - Assess for incontinence   - Turn and reposition patient  - Assist with mobility/ambulation  - Relieve pressure over bony prominences  - Avoid friction and shearing  - Provide appropriate hygiene as needed including keeping skin clean and dry  - Evaluate need for skin moisturizer/barrier cream  - Collaborate with interdisciplinary team   - Patient/family teaching  - Consider wound care consult   Outcome: Progressing     Problem: PAIN - ADULT  Goal: Verbalizes/displays adequate comfort level or baseline comfort level  Description: Interventions:  - Encourage patient to monitor pain and request assistance  - Assess pain using appropriate pain scale  - Administer analgesics based on type and severity of pain and evaluate response  - Implement non-pharmacological measures as appropriate and  evaluate response  - Consider cultural and social influences on pain and pain management  - Notify physician/advanced practitioner if interventions unsuccessful or patient reports new pain  Outcome: Progressing     Problem: INFECTION - ADULT  Goal: Absence or prevention of progression during hospitalization  Description: INTERVENTIONS:  - Assess and monitor for signs and symptoms of infection  - Monitor lab/diagnostic results  - Monitor all insertion sites, i.e. indwelling lines, tubes, and drains  - Monitor endotracheal if appropriate and nasal secretions for changes in amount and color  - [place] appropriate cooling/warming therapies per order  - Administer medications as ordered  - Instruct and encourage patient and family to use good hand hygiene technique  - Identify and instruct in appropriate isolation precautions for identified infection/condition  Outcome: Progressing     Problem: SAFETY ADULT  Goal: Patient will remain free of falls  Description: INTERVENTIONS:  - Educate patient/family on patient safety including physical limitations  - Instruct patient to call for assistance with activity   - Consult OT/PT to assist with strengthening/mobility   - Keep Call bell within reach  - Keep bed low and locked with side rails adjusted as appropriate  - Keep care items and personal belongings within reach  - Initiate and maintain comfort rounds  - Make Fall Risk Sign visible to staff  - Offer Toileting every 2 Hours, in advance of need  - Initiate/Maintain bed/chair alarm  - Obtain necessary fall risk management equipment: non skid socks, call bell in reach   - Apply yellow socks and bracelet for high fall risk patients  - Consider moving patient to room near nurses station  Outcome: Progressing  Goal: Maintain or return to baseline ADL function  Description: INTERVENTIONS:  -  Assess patient's ability to carry out ADLs; assess patient's baseline for ADL function and identify physical deficits which impact  ability to perform ADLs (bathing, care of mouth/teeth, toileting, grooming, dressing, etc.)  - Assess/evaluate cause of self-care deficits   - Assess range of motion  - Assess patient's mobility; develop plan if impaired  - Assess patient's need for assistive devices and provide as appropriate  - Encourage maximum independence but intervene and supervise when necessary  - Involve family in performance of ADLs  - Assess for home care needs following discharge   - Consider OT consult to assist with ADL evaluation and planning for discharge  - Provide patient education as appropriate  Outcome: Progressing  Goal: Maintains/Returns to pre admission functional level  Description: INTERVENTIONS:  - Perform AM-PAC 6 Click Basic Mobility/ Daily Activity assessment daily.  - Set and communicate daily mobility goal to care team and patient/family/caregiver.   - Collaborate with rehabilitation services on mobility goals if consulted  - Perform Range of Motion 3 times a day.  - Reposition patient every 3 hours.  - Dangle patient 3 times a day  - Stand patient 3 times a day  - Ambulate patient 3 times a day  - Out of bed to chair 3 times a day   - Out of bed for meals 3 times a day  - Out of bed for toileting  - Record patient progress and toleration of activity level   Outcome: Progressing     Problem: DISCHARGE PLANNING  Goal: Discharge to home or other facility with appropriate resources  Description: INTERVENTIONS:  - Identify barriers to discharge w/patient and caregiver  - Arrange for needed discharge resources and transportation as appropriate  - Identify discharge learning needs (meds, wound care, etc.)  - Arrange for interpretive services to assist at discharge as needed  - Refer to Case Management Department for coordinating discharge planning if the patient needs post-hospital services based on physician/advanced practitioner order or complex needs related to functional status, cognitive ability, or social support  system  Outcome: Progressing     Problem: Knowledge Deficit  Goal: Patient/family/caregiver demonstrates understanding of disease process, treatment plan, medications, and discharge instructions  Description: Complete learning assessment and assess knowledge base.  Interventions:  - Provide teaching at level of understanding  - Provide teaching via preferred learning methods  Outcome: Progressing     Problem: GENITOURINARY - ADULT  Goal: Maintains or returns to baseline urinary function  Description: INTERVENTIONS:  - Assess urinary function  - Encourage oral fluids to ensure adequate hydration if ordered  - Administer IV fluids as ordered to ensure adequate hydration  - Administer ordered medications as needed  - Offer frequent toileting  - Follow urinary retention protocol if ordered  Outcome: Progressing  Goal: Absence of urinary retention  Description: INTERVENTIONS:  - Assess patient’s ability to void and empty bladder  - Monitor I/O  - Bladder scan as needed  - Discuss with physician/AP medications to alleviate retention as needed  - Discuss catheterization for long term situations as appropriate  Outcome: Progressing  Goal: Urinary catheter remains patent  Description: INTERVENTIONS:  - Assess patency of urinary catheter  - If patient has a chronic luis, consider changing catheter if non-functioning  - Follow guidelines for intermittent irrigation of non-functioning urinary catheter  Outcome: Progressing

## 2024-05-17 NOTE — TELEPHONE ENCOUNTER
Pt was transferred from GI in regards to scheduling an appt with URO for recent CT results of renal mass. Please review for appropriate appt    Pt call back-[phone]

## 2024-05-17 NOTE — TELEPHONE ENCOUNTER
Pt calling to schedule a FU form ED and I have nothing within two week so have transferred him to nurse John for assistance

## 2024-05-17 NOTE — CONSULTS
"Consultation -  [facility]  Pj Vences 48 y.o. male MRN: [id number]  Unit/Bed#: 409-01 Encounter: [id number]        Consults    Reason for Consult / Principal Problem:     Diverticulitis   Abdominal Pain      ASSESSMENT AND PLAN:       Pj is a 49 y/o male with no known medial history who presents with epigastric pain, n/v. His family provide interpretation; cyracom deferred.     Sigmoid diverticulitis   Pt presents on 5/15 with 1-2 days of n/v and epigastric pain. CT notable for \"mild sigmoid colitis.diverticulitis\" VS neoplasm and renal mass. Pt was started on flagyl/cef and notes that his pain and n/v have alleviated. He had one episode of BRB in stool 1 month ago but has not had any since. He denies prior colonoscopy. VS stable. CBC WNL.   -so long as pt remains stable and is able to keep PO intake down, he is ok for d/c from GI standpoint to continue OP antibiotics   -OP follow-up with OP colonoscopy in 6-8 weeks so long as he remains stable   -explained to pt/family that after he is done with the antibiotic course, if he remains stable, he should start OTC fiber supplement + increase hid daily water intake to at least 64 ounces of water/day     ______________________________________________________________________    HPI:  Pj is a 49 y/o male with no known medial history who presents with epigastric pain, n/v. His family provide interpretation. They note that he started feeling \"Achy\" in his upper abdomen about a day prior to coming in but the pain worsened and he started to have NBNB n/v, which prompted ED evaluation. He says since being here, his abdominal pain has alleviated and has been able to keep down food without n/v. He says he had one episode of BRB in the stool last month but this only occurred then and never since. He denies family hx of colon cancer, unintentional weight loss, fevers, chills, night sweats, black BM. He says he usually does move his bowels daily and " denies constipation or diarrhea, but did not move his bowels yet today. He says he did indeed move his bowels yesterday without any bleeding noted. He denies prior colonoscopy.       REVIEW OF SYSTEMS:    CONSTITUTIONAL: Denies any fever, chills, rigors, and weight loss.  HEENT: No earache or tinnitus. Denies hearing loss or visual disturbances.  CARDIOVASCULAR: No chest pain or palpitations.   RESPIRATORY: Denies any cough, hemoptysis, shortness of breath or dyspnea on exertion.  GASTROINTESTINAL: As noted in the History of Present Illness.   GENITOURINARY: No problems with urination. Denies any hematuria or dysuria.  NEUROLOGIC: No dizziness or vertigo, denies headaches.   MUSCULOSKELETAL: Denies any muscle or joint pain.   SKIN: Denies skin rashes or itching.   ENDOCRINE: Denies excessive thirst. Denies intolerance to heat or cold.  PSYCHOSOCIAL: Denies depression or anxiety. Denies any recent memory loss.       Historical Information   History reviewed. No pertinent past medical history.  History reviewed. No pertinent surgical history.  Social History   Social History     Substance and Sexual Activity   Alcohol Use Yes    Comment: occasionally     Social History     Substance and Sexual Activity   Drug Use Not Currently     Social History     Tobacco Use   Smoking Status Former   Smokeless Tobacco Never     Family History   Problem Relation Age of Onset    No Known Problems Mother     No Known Problems Father        Meds/Allergies     No medications prior to admission.  Current Facility-Administered Medications   Medication Dose Route Frequency    acetaminophen (TYLENOL) tablet 650 mg  650 mg Oral Q6H PRN    cefTRIAXone (ROCEPHIN) IVPB (premix in dextrose) 1,000 mg 50 mL  1,000 mg Intravenous Q24H    heparin (porcine) subcutaneous injection 5,000 Units  5,000 Units Subcutaneous Q8H [facility]    metroNIDAZOLE (FLAGYL) IVPB (premix) 500 mg 100 mL  500 mg Intravenous Q8H    ondansetron (ZOFRAN) injection 4 mg  4 mg  "Intravenous Q6H PRN    sodium chloride 0.9 % infusion  100 mL/hr Intravenous Continuous       No Known Allergies        Objective     Blood pressure 149/90, pulse 58, temperature 98.5 °F (36.9 °C), temperature source Oral, resp. rate 17, height 5' 6\" (1.676 m), weight 109 kg (240 lb 3.2 oz), SpO2 96%. Body mass index is 38.77 kg/m².      Intake/Output Summary (Last 24 hours) at 5/17/2024 1028  Last data filed at 5/17/2024 0900  Gross per 24 hour   Intake 1450 ml   Output --   Net 1450 ml         PHYSICAL EXAM:      General Appearance:   Alert, cooperative, no distress   HEENT:   Normocephalic, atraumatic, anicteric.     Neck:  Supple, symmetrical, trachea midline   Lungs:   Clear to auscultation bilaterally; no rales, rhonchi or wheezing; respirations unlabored    Heart::   Regular rate and rhythm; no murmur, rub, or gallop.   Abdomen:   Soft, non-tender, non-distended; normal bowel sounds; no masses, no organomegaly    Genitalia:   Deferred    Rectal:   Deferred    Extremities:  No cyanosis, clubbing or edema    Pulses:  2+ and symmetric all extremities    Skin:  No jaundice, rashes, or lesions    Lymph nodes:  No palpable cervical lymphadenopathy        Lab Results:   Admission on 05/15/2024   Component Date Value    WBC 05/15/2024 8.71     RBC 05/15/2024 5.00     Hemoglobin 05/15/2024 13.8     Hematocrit 05/15/2024 42.4     MCV 05/15/2024 85     MCH 05/15/2024 27.6     MCHC 05/15/2024 32.5     RDW 05/15/2024 14.2     MPV 05/15/2024 11.5     Platelets 05/15/2024 223     nRBC 05/15/2024 0     Segmented % 05/15/2024 70     Immature Grans % 05/15/2024 1     Lymphocytes % 05/15/2024 21     Monocytes % 05/15/2024 5     Eosinophils Relative 05/15/2024 2     Basophils Relative 05/15/2024 1     Absolute Neutrophils 05/15/2024 6.16     Absolute Immature Grans 05/15/2024 0.04     Absolute Lymphocytes 05/15/2024 1.83     Absolute Monocytes 05/15/2024 0.43     Eosinophils Absolute 05/15/2024 0.21     Basophils Absolute " 05/15/2024 0.04     Sodium 05/15/2024 142     Potassium 05/15/2024 3.6     Chloride 05/15/2024 105     CO2 05/15/2024 27     ANION GAP 05/15/2024 10     BUN 05/15/2024 18     Creatinine 05/15/2024 0.81     Glucose 05/15/2024 132     Calcium 05/15/2024 9.4     AST 05/15/2024 23     ALT 05/15/2024 37     Alkaline Phosphatase 05/15/2024 82     Total Protein 05/15/2024 7.7     Albumin 05/15/2024 4.7     Total Bilirubin 05/15/2024 0.32     eGFR 05/15/2024 104     Lipase 05/15/2024 8 (L)     Color, UA 05/15/2024 Yellow     Clarity, UA 05/15/2024 Clear     Specific Gravity, UA 05/15/2024 1.025     pH, UA 05/15/2024 6.5     Leukocytes, UA 05/15/2024 Negative     Nitrite, UA 05/15/2024 Negative     Protein, UA 05/15/2024 Trace (A)     Glucose, UA 05/15/2024 Negative     Ketones, UA 05/15/2024 Negative     Urobilinogen, UA 05/15/2024 <2.0     Bilirubin, UA 05/15/2024 Negative     Occult Blood, UA 05/15/2024 Negative     hs TnI 0hr 05/15/2024 3     Ventricular Rate 05/15/2024 84     Atrial Rate 05/15/2024 84     CO Interval 05/15/2024 164     QRSD Interval 05/15/2024 96     QT Interval 05/15/2024 394     QTC Interval 05/15/2024 465     P Axis 05/15/2024 47     QRS Axis 05/15/2024 52     T Wave Axis 05/15/2024 33     hs TnI 2hr 05/15/2024 5     Delta 2hr hsTnI 05/15/2024 2     RBC, UA 05/15/2024 None Seen     WBC, UA 05/15/2024 None Seen     Epithelial Cells 05/15/2024 Occasional     Bacteria, UA 05/15/2024 None Seen     MUCUS THREADS 05/15/2024 Occasional (A)     hs TnI 4hr 05/16/2024 4     Delta 4hr hsTnI 05/16/2024 1     Platelets 05/16/2024 214     MPV 05/16/2024 11.0     Sodium 05/16/2024 140     Potassium 05/16/2024 3.6     Chloride 05/16/2024 107     CO2 05/16/2024 24     ANION GAP 05/16/2024 9     BUN 05/16/2024 13     Creatinine 05/16/2024 0.67     Glucose 05/16/2024 117     Calcium 05/16/2024 8.9     AST 05/16/2024 18     ALT 05/16/2024 31     Alkaline Phosphatase 05/16/2024 67     Total Protein 05/16/2024 6.7      Albumin 05/16/2024 4.1     Total Bilirubin 05/16/2024 0.41     eGFR 05/16/2024 112     Magnesium 05/16/2024 2.3     Phosphorus 05/16/2024 3.3     WBC 05/16/2024 8.34     RBC 05/16/2024 4.89     Hemoglobin 05/16/2024 13.4     Hematocrit 05/16/2024 41.2     MCV 05/16/2024 84     MCH 05/16/2024 27.4     MCHC 05/16/2024 32.5     RDW 05/16/2024 14.3     MPV 05/16/2024 11.1     Platelets 05/16/2024 218     nRBC 05/16/2024 0     Segmented % 05/16/2024 67     Immature Grans % 05/16/2024 0     Lymphocytes % 05/16/2024 25     Monocytes % 05/16/2024 6     Eosinophils Relative 05/16/2024 1     Basophils Relative 05/16/2024 1     Absolute Neutrophils 05/16/2024 5.69     Absolute Immature Grans 05/16/2024 0.02     Absolute Lymphocytes 05/16/2024 2.04     Absolute Monocytes 05/16/2024 0.48     Eosinophils Absolute 05/16/2024 0.07     Basophils Absolute 05/16/2024 0.04     Procalcitonin 05/16/2024 <0.05     WBC 05/17/2024 7.35     RBC 05/17/2024 4.79     Hemoglobin 05/17/2024 13.4     Hematocrit 05/17/2024 40.4     MCV 05/17/2024 84     MCH 05/17/2024 28.0     MCHC 05/17/2024 33.2     RDW 05/17/2024 14.2     MPV 05/17/2024 11.5     Platelets 05/17/2024 216     nRBC 05/17/2024 0     Segmented % 05/17/2024 50     Immature Grans % 05/17/2024 0     Lymphocytes % 05/17/2024 37     Monocytes % 05/17/2024 7     Eosinophils Relative 05/17/2024 5     Basophils Relative 05/17/2024 1     Absolute Neutrophils 05/17/2024 3.66     Absolute Immature Grans 05/17/2024 0.03     Absolute Lymphocytes 05/17/2024 2.73     Absolute Monocytes 05/17/2024 0.50     Eosinophils Absolute 05/17/2024 0.39     Basophils Absolute 05/17/2024 0.04     Sodium 05/17/2024 139     Potassium 05/17/2024 3.6     Chloride 05/17/2024 105     CO2 05/17/2024 26     ANION GAP 05/17/2024 8     BUN 05/17/2024 14     Creatinine 05/17/2024 0.81     Glucose 05/17/2024 103     Calcium 05/17/2024 8.9     eGFR 05/17/2024 104        Imaging Studies: I have personally reviewed pertinent  imaging studies.

## 2024-05-21 ENCOUNTER — TELEPHONE (OUTPATIENT)
Dept: GASTROENTEROLOGY | Facility: MEDICAL CENTER | Age: 49
End: 2024-05-21

## 2024-05-21 ENCOUNTER — OFFICE VISIT (OUTPATIENT)
Dept: GASTROENTEROLOGY | Facility: MEDICAL CENTER | Age: 49
End: 2024-05-21

## 2024-05-21 VITALS
TEMPERATURE: 98.3 F | BODY MASS INDEX: 36.96 KG/M2 | HEIGHT: 66 IN | SYSTOLIC BLOOD PRESSURE: 132 MMHG | DIASTOLIC BLOOD PRESSURE: 82 MMHG | HEART RATE: 57 BPM | WEIGHT: 230 LBS

## 2024-05-21 DIAGNOSIS — K57.92 DIVERTICULITIS: Primary | ICD-10-CM

## 2024-05-21 PROCEDURE — 99214 OFFICE O/P EST MOD 30 MIN: CPT | Performed by: NURSE PRACTITIONER

## 2024-05-21 RX ORDER — POLYETHYLENE GLYCOL 3350, SODIUM SULFATE ANHYDROUS, SODIUM BICARBONATE, SODIUM CHLORIDE, POTASSIUM CHLORIDE 236; 22.74; 6.74; 5.86; 2.97 G/4L; G/4L; G/4L; G/4L; G/4L
4000 POWDER, FOR SOLUTION ORAL ONCE
Qty: 4000 ML | Refills: 0 | Status: SHIPPED | OUTPATIENT
Start: 2024-05-21 | End: 2024-05-21

## 2024-05-21 NOTE — TELEPHONE ENCOUNTER
Procedure: Colonoscopy  Date: 07/16/2024  Physician performing: Dr. Martínez  Location of procedure:  [place]  Instructions given to patient: [place]  Diabetic: N/A  Clearances: N/A

## 2024-05-21 NOTE — PROGRESS NOTES
[facility] - Outpatient Follow-up Note  Pj Vences 48 y.o. male MRN: [id number]  Encounter: [id number]          ASSESSMENT AND PLAN:      1.  Sigmoid diverticulitis    Admitted for sudden onset of sigmoid diverticulitis 5/15/2024.  Patient presented with nausea, vomiting and abdominal pain.  CT was notable for mild sigmoid colitis/diverticulitis versus neoplasm and renal mass.  Was started on Flagyl and Ceftin.  He was sent home on 10 days of antibiotics.  He has completed 3 days of antibiotics so far.  Overall feeling better.  No abdominal pain, nausea or vomiting.  Appetite is good.  No fever or chills.  BMs are brown and loose up to 3 times per day.  Denies any melena hematochezia.  Never had a colonoscopy.  Colonoscopy recommended in 6 to 8 weeks.  Reviewed in detail with patient diverticulitis disease process, treatment and possible complications.  We also reviewed diet.    -Complete metronidazole and Ceftin antibiotics x 10 days  -Low residue diet  -Colonoscopy in 6 to 8 weeks with GoLytely/Dulcolax prep  -Follow-up in office after testing  ______________________________________________________________________    SUBJECTIVE: 48-year-old male here for follow-up.  He was last seen in the hospital 5/17/2024 for sigmoid diverticulitis.  He presented on 5 1522 days of nausea/vomiting and epigastric pain.  CT notable for mild sigmoid colitis/diverticulitis versus neoplasm and renal mass.  Patient was started on Flagyl and Ceftin and notes that his pain and nausea and vomiting resolved.  He had 1 episode of bright red blood in stool 1 month prior to symptoms starting.  Never had a colonoscopy. [place] required for translation as patient only speaks Belgian.     Interval history: He has completed 3 days of antibiotics.  Overall he is feeling well.  No abdominal pain.  BMs are still loose up to 3 times per day.  Denies any melena or hematochezia.  Appetite is good.  Never had a  "colonoscopy.  No family history of any colon cancers or polyps.    CT abdomen pelvis 5/15/2024-findings suggestive of mild mid sigmoid colitis/diverticulitis versus early neoplasm.  Recommend follow-up colonoscopy when clinically appropriate/after resolution of acute illness.  Extensive descending and sigmoid diverticulosis.  Large heterogeneous and had 7.7 cm right midpole renal mass containing thin calcifications noted highly concerning for renal cell carcinoma.  Recommend nonemergent urology consultation.    Labs 5/17/2024-BMP normal, CBC normal.    Prior EGD/colonoscopy     Never had an EGD or colonoscopy    REVIEW OF SYSTEMS IS OTHERWISE NEGATIVE.  10 point review of systems negative other than per HPI      Historical Information   History reviewed. No pertinent past medical history.  History reviewed. No pertinent surgical history.  Social History   Social History     Substance and Sexual Activity   Alcohol Use Yes    Comment: occasionally     Social History     Substance and Sexual Activity   Drug Use Not Currently     Social History     Tobacco Use   Smoking Status Former   Smokeless Tobacco Never     Family History   Problem Relation Age of Onset   • Cancer Mother         vaginal   • No Known Problems Father        Meds/Allergies       Current Outpatient Medications:   •  cefuroxime (CEFTIN) 500 mg tablet  •  metroNIDAZOLE (FLAGYL) 500 mg tablet  •  polyethylene glycol (Golytely) 4000 mL solution    No Known Allergies        Objective     Blood pressure 132/82, pulse 57, temperature 98.3 °F (36.8 °C), temperature source Tympanic, height 5' 6\" (1.676 m), weight 104 kg (230 lb). Body mass index is 37.12 kg/m².      PHYSICAL EXAM:      General Appearance:   Alert, cooperative, no distress   HEENT:   Normocephalic, atraumatic, anicteric.     Neck:  Supple, symmetrical, trachea midline   Lungs:   Clear to auscultation bilaterally; no rales, rhonchi or wheezing; respirations unlabored    Heart::   Regular rate " and rhythm; no murmur, rub, or gallop.   Abdomen:   Soft, non-tender, non-distended; normal bowel sounds; no masses, no organomegaly    Genitalia:   Deferred    Rectal:   Deferred    Extremities:  No cyanosis, clubbing or edema    Pulses:  2+ and symmetric    Skin:  No jaundice, rashes, or lesions    Lymph nodes:  No palpable cervical lymphadenopathy        Lab Results:   No visits with results within 1 Day(s) from this visit.   Latest known visit with results is:   Admission on 05/15/2024, Discharged on 05/17/2024   Component Date Value   • WBC 05/15/2024 8.71    • RBC 05/15/2024 5.00    • Hemoglobin 05/15/2024 13.8    • Hematocrit 05/15/2024 42.4    • MCV 05/15/2024 85    • MCH 05/15/2024 27.6    • MCHC 05/15/2024 32.5    • RDW 05/15/2024 14.2    • MPV 05/15/2024 11.5    • Platelets 05/15/2024 223    • nRBC 05/15/2024 0    • Segmented % 05/15/2024 70    • Immature Grans % 05/15/2024 1    • Lymphocytes % 05/15/2024 21    • Monocytes % 05/15/2024 5    • Eosinophils Relative 05/15/2024 2    • Basophils Relative 05/15/2024 1    • Absolute Neutrophils 05/15/2024 6.16    • Absolute Immature Grans 05/15/2024 0.04    • Absolute Lymphocytes 05/15/2024 1.83    • Absolute Monocytes 05/15/2024 0.43    • Eosinophils Absolute 05/15/2024 0.21    • Basophils Absolute 05/15/2024 0.04    • Sodium 05/15/2024 142    • Potassium 05/15/2024 3.6    • Chloride 05/15/2024 105    • CO2 05/15/2024 27    • ANION GAP 05/15/2024 10    • BUN 05/15/2024 18    • Creatinine 05/15/2024 0.81    • Glucose 05/15/2024 132    • Calcium 05/15/2024 9.4    • AST 05/15/2024 23    • ALT 05/15/2024 37    • Alkaline Phosphatase 05/15/2024 82    • Total Protein 05/15/2024 7.7    • Albumin 05/15/2024 4.7    • Total Bilirubin 05/15/2024 0.32    • eGFR 05/15/2024 104    • Lipase 05/15/2024 8 (L)    • Color, UA 05/15/2024 Yellow    • Clarity, UA 05/15/2024 Clear    • Specific Gravity, UA 05/15/2024 1.025    • pH, UA 05/15/2024 6.5    • Leukocytes, UA 05/15/2024  Negative    • Nitrite, UA 05/15/2024 Negative    • Protein, UA 05/15/2024 Trace (A)    • Glucose, UA 05/15/2024 Negative    • Ketones, UA 05/15/2024 Negative    • Urobilinogen, UA 05/15/2024 <2.0    • Bilirubin, UA 05/15/2024 Negative    • Occult Blood, UA 05/15/2024 Negative    • hs TnI 0hr 05/15/2024 3    • Ventricular Rate 05/15/2024 84    • Atrial Rate 05/15/2024 84    • DC Interval 05/15/2024 164    • QRSD Interval 05/15/2024 96    • QT Interval 05/15/2024 394    • QTC Interval 05/15/2024 465    • P Axis 05/15/2024 47    • QRS Axis 05/15/2024 52    • T Wave Axis 05/15/2024 33    • hs TnI 2hr 05/15/2024 5    • Delta 2hr hsTnI 05/15/2024 2    • RBC, UA 05/15/2024 None Seen    • WBC, UA 05/15/2024 None Seen    • Epithelial Cells 05/15/2024 Occasional    • Bacteria, UA 05/15/2024 None Seen    • MUCUS THREADS 05/15/2024 Occasional (A)    • hs TnI 4hr 05/16/2024 4    • Delta 4hr hsTnI 05/16/2024 1    • Platelets 05/16/2024 214    • MPV 05/16/2024 11.0    • Sodium 05/16/2024 140    • Potassium 05/16/2024 3.6    • Chloride 05/16/2024 107    • CO2 05/16/2024 24    • ANION GAP 05/16/2024 9    • BUN 05/16/2024 13    • Creatinine 05/16/2024 0.67    • Glucose 05/16/2024 117    • Calcium 05/16/2024 8.9    • AST 05/16/2024 18    • ALT 05/16/2024 31    • Alkaline Phosphatase 05/16/2024 67    • Total Protein 05/16/2024 6.7    • Albumin 05/16/2024 4.1    • Total Bilirubin 05/16/2024 0.41    • eGFR 05/16/2024 112    • Magnesium 05/16/2024 2.3    • Phosphorus 05/16/2024 3.3    • WBC 05/16/2024 8.34    • RBC 05/16/2024 4.89    • Hemoglobin 05/16/2024 13.4    • Hematocrit 05/16/2024 41.2    • MCV 05/16/2024 84    • MCH 05/16/2024 27.4    • MCHC 05/16/2024 32.5    • RDW 05/16/2024 14.3    • MPV 05/16/2024 11.1    • Platelets 05/16/2024 218    • nRBC 05/16/2024 0    • Segmented % 05/16/2024 67    • Immature Grans % 05/16/2024 0    • Lymphocytes % 05/16/2024 25    • Monocytes % 05/16/2024 6    • Eosinophils Relative 05/16/2024 1    •  Basophils Relative 05/16/2024 1    • Absolute Neutrophils 05/16/2024 5.69    • Absolute Immature Grans 05/16/2024 0.02    • Absolute Lymphocytes 05/16/2024 2.04    • Absolute Monocytes 05/16/2024 0.48    • Eosinophils Absolute 05/16/2024 0.07    • Basophils Absolute 05/16/2024 0.04    • Procalcitonin 05/16/2024 <0.05    • WBC 05/17/2024 7.35    • RBC 05/17/2024 4.79    • Hemoglobin 05/17/2024 13.4    • Hematocrit 05/17/2024 40.4    • MCV 05/17/2024 84    • MCH 05/17/2024 28.0    • MCHC 05/17/2024 33.2    • RDW 05/17/2024 14.2    • MPV 05/17/2024 11.5    • Platelets 05/17/2024 216    • nRBC 05/17/2024 0    • Segmented % 05/17/2024 50    • Immature Grans % 05/17/2024 0    • Lymphocytes % 05/17/2024 37    • Monocytes % 05/17/2024 7    • Eosinophils Relative 05/17/2024 5    • Basophils Relative 05/17/2024 1    • Absolute Neutrophils 05/17/2024 3.66    • Absolute Immature Grans 05/17/2024 0.03    • Absolute Lymphocytes 05/17/2024 2.73    • Absolute Monocytes 05/17/2024 0.50    • Eosinophils Absolute 05/17/2024 0.39    • Basophils Absolute 05/17/2024 0.04    • Sodium 05/17/2024 139    • Potassium 05/17/2024 3.6    • Chloride 05/17/2024 105    • CO2 05/17/2024 26    • ANION GAP 05/17/2024 8    • BUN 05/17/2024 14    • Creatinine 05/17/2024 0.81    • Glucose 05/17/2024 103    • Calcium 05/17/2024 8.9    • eGFR 05/17/2024 104          Radiology Results:   CT abdomen pelvis with contrast    Result Date: 5/16/2024  Narrative: CT ABDOMEN AND PELVIS WITH IV CONTRAST INDICATION: epigastric discomfort, complaints of possible blood in stool.. COMPARISON: None. TECHNIQUE: CT examination of the abdomen and pelvis was performed. Multiplanar 2D reformatted images were created from the source data. This examination, like all CT scans performed in the [facility], was performed utilizing techniques to minimize radiation dose exposure, including the use of iterative reconstruction and automated exposure control. Radiation  dose length product (DLP) for this visit: 1442 mGy-cm IV Contrast: 100 mL of iohexol (OMNIPAQUE) Enteric Contrast: Not administered. FINDINGS: ABDOMEN LOWER CHEST: Small nonspecific air density focus in the right anterior cardiophrenic angle region of uncertain etiology or clinical significance. No pleural or pericardial effusions. Visualized lung bases appear adequately aerated and clear LIVER/BILIARY TREE: Unremarkable. GALLBLADDER: No calcified gallstones. No pericholecystic inflammatory change. SPLEEN: Unremarkable. Small accessory splenules near the splenic hilar region. PANCREAS: Unremarkable. ADRENAL GLANDS: Unremarkable. KIDNEYS/URETERS: Kidneys are normal in size and position. Left kidney is unremarkable. No hydronephrosis or perinephric fluid collections bilaterally. Large heterogeneously enhancing right midpole renal mass containing a few faint punctate calcifications  noted measuring approximately 7.0 x 5.1 x 6.2 cm. This is highly concerning for renal cell carcinoma. Recommend nonemergent urology consultation. No pathologically enlarged by retroperitoneal or perinephric lymph nodes noted. The right renal vein and IVC appear grossly patent and unremarkable. Single right renal artery noted. STOMACH AND BOWEL: Stomach is mildly distended with air and fluid/debris. No gross intraluminal mass. Small and large bowel loops are normal in course and caliber without evidence for bowel obstruction. The terminal ileum and appendix are grossly unremarkable. There is subtle abnormal wall thickening of the mid sigmoid colon with minimal hazy pericolonic mesenteric inflammatory stranding seen. Numerous descending and sigmoid colon diverticuli noted. Overall findings could suggest mild focal diverticulitis of the mid sigmoid versus sigmoid neoplasm with superimposed inflammation. Recommend follow-up colonoscopy when clinically appropriate. No pericolonic free air or abscess/fluid collection. APPENDIX: No findings to  suggest appendicitis. ABDOMINOPELVIC CAVITY: No ascites. No pneumoperitoneum. No lymphadenopathy. VESSELS: Unremarkable for patient's age. PELVIS REPRODUCTIVE ORGANS: Unremarkable for patient's age. URINARY BLADDER: Unremarkable. ABDOMINAL WALL/INGUINAL REGIONS: Unremarkable. BONES: No acute fracture or suspicious osseous lesion.     Impression: 1.  Findings suggestive of mild mid sigmoid colitis/diverticulitis versus early neoplasm as discussed above. Recommend follow-up colonoscopy when clinically appropriate/after resolution of acute illness. Extensive descending and sigmoid diverticulosis. No findings of bowel obstruction, appendicitis, free air, free fluid, or loculated fluid collections in the abdomen/pelvis. 2.  Large heterogeneously enhancing 7.0 cm right midpole renal mass containing thin calcifications noted, highly concerning for renal cell carcinoma. Recommend nonemergent urology consultation. Workstation performed: [id number]    [phone]

## 2024-05-31 ENCOUNTER — OFFICE VISIT (OUTPATIENT)
Dept: UROLOGY | Facility: MEDICAL CENTER | Age: 49
End: 2024-05-31

## 2024-05-31 VITALS
WEIGHT: 233.8 LBS | BODY MASS INDEX: 37.57 KG/M2 | DIASTOLIC BLOOD PRESSURE: 84 MMHG | HEIGHT: 66 IN | OXYGEN SATURATION: 98 % | HEART RATE: 53 BPM | SYSTOLIC BLOOD PRESSURE: 116 MMHG

## 2024-05-31 DIAGNOSIS — N28.89 RIGHT RENAL MASS: ICD-10-CM

## 2024-05-31 LAB
BACTERIA UR QL AUTO: ABNORMAL /HPF
BILIRUB UR QL STRIP: NEGATIVE
CLARITY UR: CLEAR
COLOR UR: YELLOW
GLUCOSE UR STRIP-MCNC: NEGATIVE MG/DL
HGB UR QL STRIP.AUTO: NEGATIVE
HYALINE CASTS #/AREA URNS LPF: ABNORMAL /LPF
KETONES UR STRIP-MCNC: NEGATIVE MG/DL
LEUKOCYTE ESTERASE UR QL STRIP: NEGATIVE
MUCOUS THREADS UR QL AUTO: ABNORMAL
NITRITE UR QL STRIP: NEGATIVE
NON-SQ EPI CELLS URNS QL MICRO: ABNORMAL /HPF
PH UR STRIP.AUTO: 5.5 [PH]
PROT UR STRIP-MCNC: ABNORMAL MG/DL
RBC #/AREA URNS AUTO: ABNORMAL /HPF
SL AMB  POCT GLUCOSE, UA: NORMAL
SL AMB LEUKOCYTE ESTERASE,UA: NORMAL
SL AMB POCT BILIRUBIN,UA: NORMAL
SL AMB POCT BLOOD,UA: NORMAL
SL AMB POCT CLARITY,UA: CLEAR
SL AMB POCT COLOR,UA: YELLOW
SL AMB POCT KETONES,UA: NORMAL
SL AMB POCT NITRITE,UA: NORMAL
SL AMB POCT PH,UA: 5.5
SL AMB POCT SPECIFIC GRAVITY,UA: 1.02
SL AMB POCT URINE PROTEIN: NORMAL
SL AMB POCT UROBILINOGEN: 0.2
SP GR UR STRIP.AUTO: 1.02 (ref 1–1.03)
UROBILINOGEN UR STRIP-ACNC: <2 MG/DL
WBC #/AREA URNS AUTO: ABNORMAL /HPF

## 2024-05-31 PROCEDURE — 81001 URINALYSIS AUTO W/SCOPE: CPT | Performed by: UROLOGY

## 2024-05-31 PROCEDURE — 99204 OFFICE O/P NEW MOD 45 MIN: CPT | Performed by: UROLOGY

## 2024-05-31 PROCEDURE — 81003 URINALYSIS AUTO W/O SCOPE: CPT | Performed by: UROLOGY

## 2024-05-31 PROCEDURE — 87086 URINE CULTURE/COLONY COUNT: CPT | Performed by: UROLOGY

## 2024-05-31 RX ORDER — METRONIDAZOLE 500 MG/1
500 TABLET ORAL EVERY 8 HOURS SCHEDULED
COMMUNITY

## 2024-05-31 RX ORDER — CEFUROXIME AXETIL 500 MG/1
500 TABLET ORAL EVERY 12 HOURS SCHEDULED
COMMUNITY

## 2024-05-31 NOTE — ASSESSMENT & PLAN NOTE
Today we had a lengthy discussion regarding implications of a diagnosis of a renal mass.      We discussed that roughly 80% of enhancing renal masses are malignant and I explained that 15-20% of newly-diagnosed localized renal lesions will prove to be benign upon resection.   Renal mass 7 cm in size is malignant 95% of the time and risk of metastatic spread is 15-20%     We discussed the role of renal mass biopsy.     I explained that the risks of biopsy are as follows: bleeding requiring transfusion in (1-2%), risk of renal unit loss, and, in cases of severe bleeding, potential disruptions of surgical tissue planes. Biopsy of cystic lesions is generally not performed, given the possibility of seeding upon cyst rupture and the fact that imaging features of these cysts are most often adequately robust to differentiate benign from malignant disease.      We had a discussion regarding treatment options, which I placed in the following categories: (1) active surveillance (2) ablative techniques and (3) surgical excision (open, laparoscopic, and robotic-assisted laparoscopic surgeries). I explained that radiation therapy has a limited role in the contemporary management of localized renal masses. Furthermore, I explained that medical therapy (i.e. targeted therapy / immunotherapy / chemotherapy) is largely reserved for patients with systemic disease. We discussed the risks, alternatives, and benefits of each approach.     We discussed the active surveillance of small renal masses and the follow-up protocol in terms of the need for repeat imaging and patient compliance the real burden of potential patient anxiety regarding this approach.  I explained that this is the preferred modality of management and small renal masses measuring less than 4 centimeters in older and sicker patients.    I did explain that surgical excision is the gold-standard treatment for localized renal cell carcinoma; however, approaches such as  active surveillance and ablative techniques such as cryotherapy and radiofrequency ablation can be helpful in effectively managing renal masses for patients who are at high risk for surgical intervention.      Focal ablative therapies such as were then reviewed with the patient. The benefits of this treatment are the ease of execution, favorable patient tolerance, and relatively low jenny-procedural risks.  Additionally, this approach leaves the vast majority of the non-cancerous portion of the kidney untouched and is, therefore, nephron-sparing.      Cons of this therapeutic approach were then discussed.  The lack of tumor removal for therapeutic and diagnostic considerations is compounded by the absence of high-quality data regarding long-term oncological outcomes. In fact, current data are far less robust for ablative therapy than for surgical resection, and some experts contend that the impact of focal therapy on the natural history of the small renal mass has yet to be proven. I communicated my opinion that focal therapy largely should be reserved for patients who are at high-risk for surgical intervention and who are uncomfortable with the concept of active surveillance.     Finally, we discussed options for surgical removal of the renal mass. I explained that the goals of excisional therapy are as follows:  1) Oncological safety  2) Nephron preservation  3) Application of minimally-invasive surgical approaches when appropriate    I explained that partial nephrectomy (i.e, removal of only the renal tumor without sacrifice of the entire kidney) has become the standard of care for appropriately sized and oriented lesions.     As such, I encourage partial nephrectomy in all cases where this is feasible. When the risks of partial nephrectomy outweigh the benefits or when it is not anatomically possible, a radical nephrectomy is necessary. I explained that long-term retrospective data suggest oncological equivalence  between radical and partial nephrectomy in appropriately selected patients. Furthermore, I communicated that given the significantly lower intra-operative blood loss and shorter convalescence, laparoscopic radical nephrectomy, when possible, is the standard of care for renal surgery that requires total removal of the kidney.        Risks of open, laparoscopic, and robotic-assisted laparoscopic surgery were   discussed in detail including medical and anesthetic complications (e.g., heart attack, stroke, deep vein thrombosis, pulmonary embolism, infection (pneumonia, etc.), bleeding with possible need for transfusion, adjacent organ involvement or injury (including but not limited to the risk of damage to large or small bowel, liver or spleen), wound complications, and reaction to medications. We discussed that overall kidney function may be impaired. We reviewed the risks for immediate or delayed, temporary or permanent dialysis in the context of the patient's current situation.     The expected hospital and post-operative courses were reviewed. We reviewed what to expect with regard to incisions, post-operative pain, and risks of subsequent hernias.  I communicated that there is always a small chance of open conversion when minimally-invasive approaches are employed and that the chance of this is around 1%. For partial nephrectomy, I communicated the additional risks of urinary leak/fistula and the potential for a prolonged drain at the operative site or a ureteral stent. I also explained that conversion to a radical nephrectomy at the time or surgery or in a delayed fashion is rare but possible.  Risks of delayed bleeding in the form of arteriovenous malformation and pseudoaneurysm were also discussed.     I communicated that the decision process regarding renal surgery can be complex. I invited the patient to be proactive in learning about renal masses and the risks and benefits of treatment. I explained that I am  always willing to answer any questions that may arise following the office visit and encouraged them to call my office with any questions during this anxiety-provoking period. All questions and concerns were answered to the patient's satisfaction with regard to major urologic surgery with risk.      The plan moving forward:  - UA, UCX  - CT chest for staging  - schedule for robotic assisted lap RIGHT radical nephrectomy  - does not take blood thinning medications or DM medications

## 2024-05-31 NOTE — H&P (VIEW-ONLY)
5/31/2024    Pj Adanany  1975  [id number]    1. Right renal mass  -     Ambulatory Referral to Urology  -     CT chest w contrast; Future; Expected date: 05/31/2024  -     POCT urine dip auto non-scope       History of Present Illness  48 y.o. male with a history of GERD who presented to the ED on 5/15/2024 with epigastric pain with nausea/vomiting  No history of urinary tract infections or pyelonephritis  No flank pain  No hematuria  Quit smoking 5 years ago: smoked 1 cigarette per day intermittently for several years      Normal renal function  Normal LFTs  Normal appearing contralateral kidney      CTAP (5/15/2024)  IMPRESSION:     1.  Findings suggestive of mild mid sigmoid colitis/diverticulitis versus early neoplasm as discussed above. Recommend follow-up colonoscopy when clinically appropriate/after resolution of acute illness. Extensive descending and sigmoid diverticulosis.   No findings of bowel obstruction, appendicitis, free air, free fluid, or loculated fluid collections in the abdomen/pelvis.  2.  Large heterogeneously enhancing 7.0 cm right midpole renal mass containing thin calcifications noted, highly concerning for renal cell carcinoma. Recommend nonemergent urology consultation.             AUA Symptom Score  AUA SYMPTOM SCORE      Flowsheet Row Most Recent Value   AUA SYMPTOM SCORE    How often have you had a sensation of not emptying your bladder completely after you finished urinating? 2   How often have you had to urinate again less than two hours after you finished urinating? 1   How often have you found you stopped and started again several times when you urinate? 0   How often have you found it difficult to postpone urination? 1   How often have you had a weak urinary stream? 0   How often have you had to push or strain to begin urination? 0   How many times did you most typically get up to urinate from the time you went to bed at night until the time you got up in the morning? 5    Quality of Life: If you were to spend the rest of your life with your urinary condition just the way it is now, how would you feel about that? 6   AUA SYMPTOM SCORE 9            Review of Systems   All other systems reviewed and are negative.      Past Medical History  History reviewed. No pertinent past medical history.    Past Social History  History reviewed. No pertinent surgical history.    Past Family History  Family History   Problem Relation Age of Onset    Cancer Mother         vaginal    No Known Problems Father        Past Social history  Social History     Socioeconomic History    Marital status: /Civil Union     Spouse name: Not on file    Number of children: Not on file    Years of education: Not on file    Highest education level: Not on file   Occupational History    Not on file   Tobacco Use    Smoking status: Former    Smokeless tobacco: Never   Vaping Use    Vaping status: Never Used   Substance and Sexual Activity    Alcohol use: Yes     Comment: occasionally    Drug use: Not Currently    Sexual activity: Not on file   Other Topics Concern    Not on file   Social History Narrative    Not on file     Social Determinants of Health     Financial Resource Strain: Not on file   Food Insecurity: No Food Insecurity (5/17/2024)    Hunger Vital Sign     Worried About Running Out of Food in the Last Year: Never true     Ran Out of Food in the Last Year: Never true   Transportation Needs: No Transportation Needs (5/17/2024)    PRAPARE - Transportation     Lack of Transportation (Medical): No     Lack of Transportation (Non-Medical): No   Physical Activity: Not on file   Stress: Not on file   Social Connections: Not on file   Intimate Partner Violence: Not on file   Housing Stability: Low Risk  (5/17/2024)    Housing Stability Vital Sign     Unable to Pay for Housing in the Last Year: No     Number of Times Moved in the Last Year: 0     Homeless in the Last Year: No       Current  "Medications  Current Outpatient Medications   Medication Sig Dispense Refill    cefuroxime (CEFTIN) 500 mg tablet Take 500 mg by mouth every 12 (twelve) hours      metroNIDAZOLE (FLAGYL) 500 mg tablet Take 500 mg by mouth every 8 (eight) hours      polyethylene glycol (Golytely) 4000 mL solution Take 4,000 mL by mouth once for 1 dose Take 4000 mL by mouth once for 1 dose. Use as directed 4000 mL 0     No current facility-administered medications for this visit.       Allergies  No Known Allergies    Past Medical History, Social History, Family History, medications and allergies were reviewed.    Vitals  Vitals:    05/31/24 1011   BP: 116/84   BP Location: Left arm   Patient Position: Sitting   Cuff Size: Large   Pulse: (!) 53   SpO2: 98%   Weight: 106 kg (233 lb 12.8 oz)   Height: 5' 6\" (1.676 m)       Physical Exam  Constitutional:       Appearance: Normal appearance. He is obese.   HENT:      Head: Normocephalic.      Nose: Nose normal. No congestion.   Eyes:      Conjunctiva/sclera: Conjunctivae normal.   Cardiovascular:      Rate and Rhythm: Normal rate.   Pulmonary:      Effort: Pulmonary effort is normal. No respiratory distress.   Abdominal:      General: Abdomen is flat. There is no distension.      Palpations: Abdomen is soft.      Tenderness: There is no right CVA tenderness or left CVA tenderness.   Musculoskeletal:      Comments: Normal gait   Skin:     General: Skin is warm and dry.   Neurological:      General: No focal deficit present.      Mental Status: He is alert and oriented to person, place, and time.   Psychiatric:         Mood and Affect: Mood normal.         Results  No results found for: \"PSA\"  Lab Results   Component Value Date    CALCIUM 8.9 05/17/2024    K 3.6 05/17/2024    CO2 26 05/17/2024     05/17/2024    BUN 14 05/17/2024    CREATININE 0.81 05/17/2024     Lab Results   Component Value Date    WBC 7.35 05/17/2024    HGB 13.4 05/17/2024    HCT 40.4 05/17/2024    MCV 84 05/17/2024 "     05/17/2024       Office Urine Dip  Recent Results (from the past 1 hour(s))   POCT urine dip auto non-scope    Collection Time: 05/31/24 10:25 AM   Result Value Ref Range     COLOR,UA Yellow     CLARITY,UA Clear     SPECIFIC GRAVITY,UA 1.025      PH,UA 5.5     LEUKOCYTE ESTERASE,UA Neg     NITRITE,UA NEg     GLUCOSE, UA Neg     KETONES,UA Neg     BILIRUBIN,UA Neg     BLOOD,UA Neg     POCT URINE PROTEIN Neg     SL AMB POCT UROBILINOGEN 0.2    ]

## 2024-05-31 NOTE — PROGRESS NOTES
5/31/2024    Pj Adanany  1975  [id number]    1. Right renal mass  -     Ambulatory Referral to Urology  -     CT chest w contrast; Future; Expected date: 05/31/2024  -     POCT urine dip auto non-scope       History of Present Illness  48 y.o. male with a history of GERD who presented to the ED on 5/15/2024 with epigastric pain with nausea/vomiting  No history of urinary tract infections or pyelonephritis  No flank pain  No hematuria  Quit smoking 5 years ago: smoked 1 cigarette per day intermittently for several years      Normal renal function  Normal LFTs  Normal appearing contralateral kidney      CTAP (5/15/2024)  IMPRESSION:     1.  Findings suggestive of mild mid sigmoid colitis/diverticulitis versus early neoplasm as discussed above. Recommend follow-up colonoscopy when clinically appropriate/after resolution of acute illness. Extensive descending and sigmoid diverticulosis.   No findings of bowel obstruction, appendicitis, free air, free fluid, or loculated fluid collections in the abdomen/pelvis.  2.  Large heterogeneously enhancing 7.0 cm right midpole renal mass containing thin calcifications noted, highly concerning for renal cell carcinoma. Recommend nonemergent urology consultation.             AUA Symptom Score  AUA SYMPTOM SCORE      Flowsheet Row Most Recent Value   AUA SYMPTOM SCORE    How often have you had a sensation of not emptying your bladder completely after you finished urinating? 2   How often have you had to urinate again less than two hours after you finished urinating? 1   How often have you found you stopped and started again several times when you urinate? 0   How often have you found it difficult to postpone urination? 1   How often have you had a weak urinary stream? 0   How often have you had to push or strain to begin urination? 0   How many times did you most typically get up to urinate from the time you went to bed at night until the time you got up in the morning? 5    Quality of Life: If you were to spend the rest of your life with your urinary condition just the way it is now, how would you feel about that? 6   AUA SYMPTOM SCORE 9            Review of Systems   All other systems reviewed and are negative.      Past Medical History  History reviewed. No pertinent past medical history.    Past Social History  History reviewed. No pertinent surgical history.    Past Family History  Family History   Problem Relation Age of Onset    Cancer Mother         vaginal    No Known Problems Father        Past Social history  Social History     Socioeconomic History    Marital status: /Civil Union     Spouse name: Not on file    Number of children: Not on file    Years of education: Not on file    Highest education level: Not on file   Occupational History    Not on file   Tobacco Use    Smoking status: Former    Smokeless tobacco: Never   Vaping Use    Vaping status: Never Used   Substance and Sexual Activity    Alcohol use: Yes     Comment: occasionally    Drug use: Not Currently    Sexual activity: Not on file   Other Topics Concern    Not on file   Social History Narrative    Not on file     Social Determinants of Health     Financial Resource Strain: Not on file   Food Insecurity: No Food Insecurity (5/17/2024)    Hunger Vital Sign     Worried About Running Out of Food in the Last Year: Never true     Ran Out of Food in the Last Year: Never true   Transportation Needs: No Transportation Needs (5/17/2024)    PRAPARE - Transportation     Lack of Transportation (Medical): No     Lack of Transportation (Non-Medical): No   Physical Activity: Not on file   Stress: Not on file   Social Connections: Not on file   Intimate Partner Violence: Not on file   Housing Stability: Low Risk  (5/17/2024)    Housing Stability Vital Sign     Unable to Pay for Housing in the Last Year: No     Number of Times Moved in the Last Year: 0     Homeless in the Last Year: No       Current  "Medications  Current Outpatient Medications   Medication Sig Dispense Refill    cefuroxime (CEFTIN) 500 mg tablet Take 500 mg by mouth every 12 (twelve) hours      metroNIDAZOLE (FLAGYL) 500 mg tablet Take 500 mg by mouth every 8 (eight) hours      polyethylene glycol (Golytely) 4000 mL solution Take 4,000 mL by mouth once for 1 dose Take 4000 mL by mouth once for 1 dose. Use as directed 4000 mL 0     No current facility-administered medications for this visit.       Allergies  No Known Allergies    Past Medical History, Social History, Family History, medications and allergies were reviewed.    Vitals  Vitals:    05/31/24 1011   BP: 116/84   BP Location: Left arm   Patient Position: Sitting   Cuff Size: Large   Pulse: (!) 53   SpO2: 98%   Weight: 106 kg (233 lb 12.8 oz)   Height: 5' 6\" (1.676 m)       Physical Exam  Constitutional:       Appearance: Normal appearance. He is obese.   HENT:      Head: Normocephalic.      Nose: Nose normal. No congestion.   Eyes:      Conjunctiva/sclera: Conjunctivae normal.   Cardiovascular:      Rate and Rhythm: Normal rate.   Pulmonary:      Effort: Pulmonary effort is normal. No respiratory distress.   Abdominal:      General: Abdomen is flat. There is no distension.      Palpations: Abdomen is soft.      Tenderness: There is no right CVA tenderness or left CVA tenderness.   Musculoskeletal:      Comments: Normal gait   Skin:     General: Skin is warm and dry.   Neurological:      General: No focal deficit present.      Mental Status: He is alert and oriented to person, place, and time.   Psychiatric:         Mood and Affect: Mood normal.         Results  No results found for: \"PSA\"  Lab Results   Component Value Date    CALCIUM 8.9 05/17/2024    K 3.6 05/17/2024    CO2 26 05/17/2024     05/17/2024    BUN 14 05/17/2024    CREATININE 0.81 05/17/2024     Lab Results   Component Value Date    WBC 7.35 05/17/2024    HGB 13.4 05/17/2024    HCT 40.4 05/17/2024    MCV 84 05/17/2024 "     05/17/2024       Office Urine Dip  Recent Results (from the past 1 hour(s))   POCT urine dip auto non-scope    Collection Time: 05/31/24 10:25 AM   Result Value Ref Range     COLOR,UA Yellow     CLARITY,UA Clear     SPECIFIC GRAVITY,UA 1.025      PH,UA 5.5     LEUKOCYTE ESTERASE,UA Neg     NITRITE,UA NEg     GLUCOSE, UA Neg     KETONES,UA Neg     BILIRUBIN,UA Neg     BLOOD,UA Neg     POCT URINE PROTEIN Neg     SL AMB POCT UROBILINOGEN 0.2    ]

## 2024-06-01 LAB — BACTERIA UR CULT: NORMAL

## 2024-06-03 ENCOUNTER — TELEPHONE (OUTPATIENT)
Dept: UROLOGY | Facility: MEDICAL CENTER | Age: 49
End: 2024-06-03

## 2024-06-06 NOTE — TELEPHONE ENCOUNTER
Called and spoke with son Anton and offered him 6/17 at [facility] OR with Dr. Corea (FT assist).     Patient's son wants to know if patient would be okay to have his colonoscopy on 7/16 after surgery. I told him I would reach out to Dr. Corea and call him back as soon as I hear.

## 2024-06-07 ENCOUNTER — PREP FOR PROCEDURE (OUTPATIENT)
Dept: UROLOGY | Facility: MEDICAL CENTER | Age: 49
End: 2024-06-07

## 2024-06-07 ENCOUNTER — HOSPITAL ENCOUNTER (OUTPATIENT)
Dept: CT IMAGING | Facility: HOSPITAL | Age: 49
Discharge: HOME | End: 2024-06-07
Attending: UROLOGY
Payer: COMMERCIAL

## 2024-06-07 DIAGNOSIS — N28.89 RIGHT RENAL MASS: ICD-10-CM

## 2024-06-07 DIAGNOSIS — Z01.812 PRE-OPERATIVE LABORATORY EXAMINATION: Primary | ICD-10-CM

## 2024-06-07 DIAGNOSIS — Z79.01 LONG TERM (CURRENT) USE OF ANTICOAGULANTS: ICD-10-CM

## 2024-06-07 PROCEDURE — 71260 CT THORAX DX C+: CPT

## 2024-06-07 RX ADMIN — IOHEXOL 85 ML: 350 INJECTION, SOLUTION INTRAVENOUS at 08:18

## 2024-06-07 NOTE — TELEPHONE ENCOUNTER
Spoke with patient and confirmed surgery date of 6/17/2024  Type of surgery: [facility] OR  Operating physician: Dr. Anmol JACKSON/ [facility]  Location of surgery: [facility]    Verbally went over prep with patient on: 6/7  NPO  Bowel prep? Yes  Hospital calls afternoon prior with arrival time -Calls Friday afternoon for Monday surgeries  Patient needs ride to and from surgery (inpatient)   Pre-op testing to be done 2 weeks prior to surgery  PTT, INR, Type & Screen  Blood thinners:   No majors  Clearances needed: None as per MM    Emailed to patient on:6/7  -- [email or web address]  Copy of packet scanned into Media on: 6/7  Labs in electronic record  Soap / Bowel prep in packet  Post-op in not packet  Nephrectomy: 2 weeks with MD *follow up  Nothing within 4 weeks, sending to nurse    Consent: in Media

## 2024-06-09 ENCOUNTER — APPOINTMENT (OUTPATIENT)
Dept: LAB | Facility: HOSPITAL | Age: 49
End: 2024-06-09

## 2024-06-09 DIAGNOSIS — N28.89 RIGHT RENAL MASS: ICD-10-CM

## 2024-06-09 DIAGNOSIS — Z01.812 PRE-OPERATIVE LABORATORY EXAMINATION: ICD-10-CM

## 2024-06-09 DIAGNOSIS — Z79.01 LONG TERM (CURRENT) USE OF ANTICOAGULANTS: ICD-10-CM

## 2024-06-09 LAB
APTT PPP: 32 SECONDS (ref 23–37)
INR PPP: 0.96 (ref 0.84–1.19)
PROTHROMBIN TIME: 12.7 SECONDS (ref 11.6–14.5)

## 2024-06-09 PROCEDURE — 36415 COLL VENOUS BLD VENIPUNCTURE: CPT

## 2024-06-09 PROCEDURE — 86901 BLOOD TYPING SEROLOGIC RH(D): CPT | Performed by: UROLOGY

## 2024-06-09 PROCEDURE — 86900 BLOOD TYPING SEROLOGIC ABO: CPT | Performed by: UROLOGY

## 2024-06-09 PROCEDURE — 85610 PROTHROMBIN TIME: CPT

## 2024-06-09 PROCEDURE — 85730 THROMBOPLASTIN TIME PARTIAL: CPT

## 2024-06-09 PROCEDURE — 86850 RBC ANTIBODY SCREEN: CPT | Performed by: UROLOGY

## 2024-06-10 ENCOUNTER — LAB REQUISITION (OUTPATIENT)
Dept: LAB | Facility: HOSPITAL | Age: 49
End: 2024-06-10

## 2024-06-10 DIAGNOSIS — Z91.89 AT RISK FOR SLEEP APNEA: Primary | ICD-10-CM

## 2024-06-10 DIAGNOSIS — Z01.812 ENCOUNTER FOR PREPROCEDURAL LABORATORY EXAMINATION: ICD-10-CM

## 2024-06-10 LAB
ABO GROUP BLD: NORMAL
BLD GP AB SCN SERPL QL: NEGATIVE
RH BLD: POSITIVE
SPECIMEN EXPIRATION DATE: NORMAL

## 2024-06-10 NOTE — PRE-PROCEDURE INSTRUCTIONS
Pt denies current meds     Medication instructions for day surgery reviewed. Please use only a sip of water to take your instructed medications. Avoid all over the counter vitamins, supplements and NSAIDS for one week prior to surgery per anesthesia guidelines. Tylenol is ok to take as needed.     You will receive a call one business day prior to surgery with an arrival time and hospital directions. If your surgery is scheduled on a Monday, the hospital will be calling you on the Friday prior to your surgery. If you have not heard from anyone by 8pm, please call the hospital supervisor through the hospital  at [phone]. ([place] [phone] or [place] [phone]).    Do not eat or drink anything after midnight the night before your surgery, including candy, mints, lifesavers, or chewing gum. Do not drink alcohol 24hrs before your surgery. Try not to smoke at least 24hrs before your surgery.       Follow the pre surgery showering instructions as listed in the “My Surgical Experience Booklet” or otherwise provided by your surgeon's office. Do not use a blade to shave the surgical area 1 week before surgery. It is okay to use a clean electric clippers up to 24 hours before surgery. Do not apply any lotions, creams, including makeup, cologne, deodorant, or perfumes after showering on the day of your surgery. Do not use dry shampoo, hair spray, hair gel, or any type of hair products.     No contact lenses, eye make-up, or artificial eyelashes. Remove nail polish, including gel polish, and any artificial, gel, or acrylic nails if possible. Remove all jewelry including rings and body piercing jewelry.     Wear causal clothing that is easy to take on and off. Consider your type of surgery.    Keep any valuables, jewelry, piercings at home. Please bring any specially ordered equipment (sling, braces) if indicated.    Arrange for a responsible person to drive you to and from the hospital on the day of your  surgery. Please confirm the visitor policy for the day of your procedure when you receive your phone call with an arrival time.     Call the surgeon's office with any new illnesses, exposures, or additional questions prior to surgery.    Please reference your “My Surgical Experience Booklet” for additional information to prepare for your upcoming surgery.

## 2024-06-12 NOTE — TELEPHONE ENCOUNTER
Called pt thru [employer]:    Marlin #[phone]     LVM for pt letting him know a 2-week post op appt has been made (following his 6/17 surgery) for 7/5/24 at 9 am.    Call back number provided in case pt has further questions.    Kajal Bradford  You5 days ago     GAURI Jauregui, patient needs 2 week post-op nephrectomy (OR on 6/17) - I was unable to find anything close to timeframe, thanks!     Kajal Bradford

## 2024-06-14 ENCOUNTER — ANESTHESIA EVENT (OUTPATIENT)
Dept: PERIOP | Facility: HOSPITAL | Age: 49
DRG: 442 | End: 2024-06-14
Payer: COMMERCIAL

## 2024-06-17 ENCOUNTER — HOSPITAL ENCOUNTER (INPATIENT)
Facility: HOSPITAL | Age: 49
LOS: 2 days | Discharge: HOME | DRG: 442 | End: 2024-06-19
Attending: UROLOGY | Admitting: UROLOGY
Payer: COMMERCIAL

## 2024-06-17 ENCOUNTER — ANESTHESIA (OUTPATIENT)
Dept: PERIOP | Facility: HOSPITAL | Age: 49
DRG: 442 | End: 2024-06-17
Payer: COMMERCIAL

## 2024-06-17 ENCOUNTER — TELEPHONE (OUTPATIENT)
Dept: UROLOGY | Facility: MEDICAL CENTER | Age: 49
End: 2024-06-17

## 2024-06-17 DIAGNOSIS — N28.89 RIGHT RENAL MASS: ICD-10-CM

## 2024-06-17 LAB
ABO GROUP BLD: NORMAL
ANION GAP SERPL CALCULATED.3IONS-SCNC: 7 MMOL/L (ref 4–13)
BUN SERPL-MCNC: 13 MG/DL (ref 5–25)
CALCIUM SERPL-MCNC: 8.5 MG/DL (ref 8.4–10.2)
CHLORIDE SERPL-SCNC: 108 MMOL/L (ref 96–108)
CO2 SERPL-SCNC: 24 MMOL/L (ref 21–32)
CREAT SERPL-MCNC: 1.09 MG/DL (ref 0.6–1.3)
ERYTHROCYTE [DISTWIDTH] IN BLOOD BY AUTOMATED COUNT: 13.9 % (ref 11.6–15.1)
GFR SERPL CREATININE-BSD FRML MDRD: 78 ML/MIN/1.73SQ M
GLUCOSE SERPL-MCNC: 145 MG/DL (ref 65–140)
HCT VFR BLD AUTO: 39.2 % (ref 36.5–49.3)
HGB BLD-MCNC: 13 G/DL (ref 12–17)
MCH RBC QN AUTO: 27.8 PG (ref 26.8–34.3)
MCHC RBC AUTO-ENTMCNC: 33.2 G/DL (ref 31.4–37.4)
MCV RBC AUTO: 84 FL (ref 82–98)
PLATELET # BLD AUTO: 210 THOUSANDS/UL (ref 149–390)
PMV BLD AUTO: 10.7 FL (ref 8.9–12.7)
POTASSIUM SERPL-SCNC: 3.5 MMOL/L (ref 3.5–5.3)
RBC # BLD AUTO: 4.68 MILLION/UL (ref 3.88–5.62)
RH BLD: POSITIVE
SODIUM SERPL-SCNC: 139 MMOL/L (ref 135–147)
WBC # BLD AUTO: 15.25 THOUSAND/UL (ref 4.31–10.16)

## 2024-06-17 PROCEDURE — S2900 ROBOTIC SURGICAL SYSTEM: HCPCS | Performed by: UROLOGY

## 2024-06-17 PROCEDURE — 85027 COMPLETE CBC AUTOMATED: CPT | Performed by: UROLOGY

## 2024-06-17 PROCEDURE — 80048 BASIC METABOLIC PNL TOTAL CA: CPT | Performed by: UROLOGY

## 2024-06-17 PROCEDURE — 30233N1 TRANSFUSION OF NONAUTOLOGOUS RED BLOOD CELLS INTO PERIPHERAL VEIN, PERCUTANEOUS APPROACH: ICD-10-PCS | Performed by: UROLOGY

## 2024-06-17 PROCEDURE — 50545 LAPARO RADICAL NEPHRECTOMY: CPT | Performed by: UROLOGY

## 2024-06-17 PROCEDURE — 88307 TISSUE EXAM BY PATHOLOGIST: CPT | Performed by: PATHOLOGY

## 2024-06-17 PROCEDURE — 8E0W4CZ ROBOTIC ASSISTED PROCEDURE OF TRUNK REGION, PERCUTANEOUS ENDOSCOPIC APPROACH: ICD-10-PCS | Performed by: UROLOGY

## 2024-06-17 PROCEDURE — NC001 PR NO CHARGE: Performed by: PHYSICIAN ASSISTANT

## 2024-06-17 PROCEDURE — 86923 COMPATIBILITY TEST ELECTRIC: CPT

## 2024-06-17 PROCEDURE — 0TT04ZZ RESECTION OF RIGHT KIDNEY, PERCUTANEOUS ENDOSCOPIC APPROACH: ICD-10-PCS | Performed by: UROLOGY

## 2024-06-17 RX ORDER — ACETAMINOPHEN 325 MG/1
650 TABLET ORAL EVERY 6 HOURS SCHEDULED
Status: DISCONTINUED | OUTPATIENT
Start: 2024-06-17 | End: 2024-06-19 | Stop reason: HOSPADM

## 2024-06-17 RX ORDER — SODIUM CHLORIDE 9 MG/ML
125 INJECTION, SOLUTION INTRAVENOUS CONTINUOUS
Status: DISCONTINUED | OUTPATIENT
Start: 2024-06-17 | End: 2024-06-17

## 2024-06-17 RX ORDER — PROPOFOL 10 MG/ML
INJECTION, EMULSION INTRAVENOUS AS NEEDED
Status: DISCONTINUED | OUTPATIENT
Start: 2024-06-17 | End: 2024-06-17

## 2024-06-17 RX ORDER — ONDANSETRON 2 MG/ML
4 INJECTION INTRAMUSCULAR; INTRAVENOUS EVERY 6 HOURS PRN
Status: DISCONTINUED | OUTPATIENT
Start: 2024-06-17 | End: 2024-06-19 | Stop reason: HOSPADM

## 2024-06-17 RX ORDER — ROCURONIUM BROMIDE 10 MG/ML
INJECTION, SOLUTION INTRAVENOUS AS NEEDED
Status: DISCONTINUED | OUTPATIENT
Start: 2024-06-17 | End: 2024-06-17

## 2024-06-17 RX ORDER — SODIUM CHLORIDE, SODIUM LACTATE, POTASSIUM CHLORIDE, CALCIUM CHLORIDE 600; 310; 30; 20 MG/100ML; MG/100ML; MG/100ML; MG/100ML
125 INJECTION, SOLUTION INTRAVENOUS CONTINUOUS
Status: DISCONTINUED | OUTPATIENT
Start: 2024-06-17 | End: 2024-06-19 | Stop reason: HOSPADM

## 2024-06-17 RX ORDER — HYDROMORPHONE HCL/PF 1 MG/ML
0.5 SYRINGE (ML) INJECTION
Status: DISCONTINUED | OUTPATIENT
Start: 2024-06-17 | End: 2024-06-17 | Stop reason: HOSPADM

## 2024-06-17 RX ORDER — FENTANYL CITRATE 50 UG/ML
INJECTION, SOLUTION INTRAMUSCULAR; INTRAVENOUS AS NEEDED
Status: DISCONTINUED | OUTPATIENT
Start: 2024-06-17 | End: 2024-06-17

## 2024-06-17 RX ORDER — LIDOCAINE HYDROCHLORIDE 10 MG/ML
INJECTION, SOLUTION EPIDURAL; INFILTRATION; INTRACAUDAL; PERINEURAL AS NEEDED
Status: DISCONTINUED | OUTPATIENT
Start: 2024-06-17 | End: 2024-06-17

## 2024-06-17 RX ORDER — ONDANSETRON 2 MG/ML
INJECTION INTRAMUSCULAR; INTRAVENOUS AS NEEDED
Status: DISCONTINUED | OUTPATIENT
Start: 2024-06-17 | End: 2024-06-17

## 2024-06-17 RX ORDER — MIDAZOLAM HYDROCHLORIDE 2 MG/2ML
INJECTION, SOLUTION INTRAMUSCULAR; INTRAVENOUS AS NEEDED
Status: DISCONTINUED | OUTPATIENT
Start: 2024-06-17 | End: 2024-06-17

## 2024-06-17 RX ORDER — ENOXAPARIN SODIUM 100 MG/ML
40 INJECTION SUBCUTANEOUS DAILY
Status: DISCONTINUED | OUTPATIENT
Start: 2024-06-18 | End: 2024-06-19 | Stop reason: HOSPADM

## 2024-06-17 RX ORDER — ONDANSETRON 2 MG/ML
4 INJECTION INTRAMUSCULAR; INTRAVENOUS ONCE AS NEEDED
Status: DISCONTINUED | OUTPATIENT
Start: 2024-06-17 | End: 2024-06-17 | Stop reason: HOSPADM

## 2024-06-17 RX ORDER — FENTANYL CITRATE/PF 50 MCG/ML
25 SYRINGE (ML) INJECTION
Status: DISCONTINUED | OUTPATIENT
Start: 2024-06-17 | End: 2024-06-17 | Stop reason: HOSPADM

## 2024-06-17 RX ORDER — OXYCODONE HYDROCHLORIDE AND ACETAMINOPHEN 5; 325 MG/1; MG/1
2 TABLET ORAL EVERY 4 HOURS PRN
Status: DISCONTINUED | OUTPATIENT
Start: 2024-06-17 | End: 2024-06-19 | Stop reason: HOSPADM

## 2024-06-17 RX ORDER — HYDROMORPHONE HCL/PF 1 MG/ML
0.5 SYRINGE (ML) INJECTION EVERY 2 HOUR PRN
Status: DISCONTINUED | OUTPATIENT
Start: 2024-06-17 | End: 2024-06-19 | Stop reason: HOSPADM

## 2024-06-17 RX ORDER — DEXAMETHASONE SODIUM PHOSPHATE 10 MG/ML
INJECTION, SOLUTION INTRAMUSCULAR; INTRAVENOUS AS NEEDED
Status: DISCONTINUED | OUTPATIENT
Start: 2024-06-17 | End: 2024-06-17

## 2024-06-17 RX ORDER — EPHEDRINE SULFATE 50 MG/ML
INJECTION INTRAVENOUS AS NEEDED
Status: DISCONTINUED | OUTPATIENT
Start: 2024-06-17 | End: 2024-06-17

## 2024-06-17 RX ORDER — MAGNESIUM HYDROXIDE 1200 MG/15ML
LIQUID ORAL AS NEEDED
Status: DISCONTINUED | OUTPATIENT
Start: 2024-06-17 | End: 2024-06-17 | Stop reason: HOSPADM

## 2024-06-17 RX ORDER — BUPIVACAINE HYDROCHLORIDE 5 MG/ML
INJECTION, SOLUTION EPIDURAL; INTRACAUDAL AS NEEDED
Status: DISCONTINUED | OUTPATIENT
Start: 2024-06-17 | End: 2024-06-17 | Stop reason: HOSPADM

## 2024-06-17 RX ORDER — BACITRACIN, NEOMYCIN, POLYMYXIN B 400; 3.5; 5 [USP'U]/G; MG/G; [USP'U]/G
1 OINTMENT TOPICAL 2 TIMES DAILY
Status: DISCONTINUED | OUTPATIENT
Start: 2024-06-17 | End: 2024-06-19 | Stop reason: HOSPADM

## 2024-06-17 RX ORDER — DOCUSATE SODIUM 100 MG/1
100 CAPSULE, LIQUID FILLED ORAL 2 TIMES DAILY
Status: DISCONTINUED | OUTPATIENT
Start: 2024-06-17 | End: 2024-06-19 | Stop reason: HOSPADM

## 2024-06-17 RX ORDER — OXYCODONE HYDROCHLORIDE AND ACETAMINOPHEN 5; 325 MG/1; MG/1
1 TABLET ORAL EVERY 4 HOURS PRN
Status: DISCONTINUED | OUTPATIENT
Start: 2024-06-17 | End: 2024-06-19 | Stop reason: HOSPADM

## 2024-06-17 RX ORDER — CEFAZOLIN SODIUM 2 G/50ML
2000 SOLUTION INTRAVENOUS ONCE
Status: COMPLETED | OUTPATIENT
Start: 2024-06-17 | End: 2024-06-17

## 2024-06-17 RX ORDER — SODIUM CHLORIDE 9 MG/ML
INJECTION, SOLUTION INTRAVENOUS CONTINUOUS PRN
Status: DISCONTINUED | OUTPATIENT
Start: 2024-06-17 | End: 2024-06-17

## 2024-06-17 RX ADMIN — MIDAZOLAM 2 MG: 1 INJECTION INTRAMUSCULAR; INTRAVENOUS at 12:52

## 2024-06-17 RX ADMIN — EPHEDRINE SULFATE 5 MG: 50 INJECTION, SOLUTION INTRAVENOUS at 15:34

## 2024-06-17 RX ADMIN — DEXAMETHASONE SODIUM PHOSPHATE 10 MG: 10 INJECTION INTRAMUSCULAR; INTRAVENOUS at 13:15

## 2024-06-17 RX ADMIN — FENTANYL CITRATE 25 MCG: 50 INJECTION INTRAMUSCULAR; INTRAVENOUS at 15:50

## 2024-06-17 RX ADMIN — PROPOFOL 200 MG: 10 INJECTION, EMULSION INTRAVENOUS at 12:55

## 2024-06-17 RX ADMIN — FENTANYL CITRATE 25 MCG: 50 INJECTION INTRAMUSCULAR; INTRAVENOUS at 13:58

## 2024-06-17 RX ADMIN — FENTANYL CITRATE 50 MCG: 50 INJECTION INTRAMUSCULAR; INTRAVENOUS at 16:01

## 2024-06-17 RX ADMIN — ROCURONIUM BROMIDE 30 MG: 10 INJECTION, SOLUTION INTRAVENOUS at 13:46

## 2024-06-17 RX ADMIN — FENTANYL CITRATE 25 MCG: 50 INJECTION INTRAMUSCULAR; INTRAVENOUS at 14:03

## 2024-06-17 RX ADMIN — DOCUSATE SODIUM 100 MG: 100 CAPSULE, LIQUID FILLED ORAL at 18:21

## 2024-06-17 RX ADMIN — BACITRACIN ZINC, NEOMYCIN, POLYMYXIN B 1 SMALL APPLICATION: 400; 3.5; 5 OINTMENT TOPICAL at 18:25

## 2024-06-17 RX ADMIN — PROPOFOL 100 MG: 10 INJECTION, EMULSION INTRAVENOUS at 12:58

## 2024-06-17 RX ADMIN — SODIUM CHLORIDE: 0.9 INJECTION, SOLUTION INTRAVENOUS at 13:10

## 2024-06-17 RX ADMIN — SODIUM CHLORIDE 125 ML/HR: 0.9 INJECTION, SOLUTION INTRAVENOUS at 10:00

## 2024-06-17 RX ADMIN — ROCURONIUM BROMIDE 10 MG: 10 INJECTION, SOLUTION INTRAVENOUS at 14:32

## 2024-06-17 RX ADMIN — FENTANYL CITRATE 25 MCG: 50 INJECTION INTRAMUSCULAR; INTRAVENOUS at 17:45

## 2024-06-17 RX ADMIN — ROCURONIUM BROMIDE 20 MG: 10 INJECTION, SOLUTION INTRAVENOUS at 15:09

## 2024-06-17 RX ADMIN — OXYCODONE HYDROCHLORIDE AND ACETAMINOPHEN 2 TABLET: 5; 325 TABLET ORAL at 19:31

## 2024-06-17 RX ADMIN — EPHEDRINE SULFATE 15 MG: 50 INJECTION, SOLUTION INTRAVENOUS at 13:36

## 2024-06-17 RX ADMIN — FENTANYL CITRATE 50 MCG: 50 INJECTION INTRAMUSCULAR; INTRAVENOUS at 16:05

## 2024-06-17 RX ADMIN — FENTANYL CITRATE 25 MCG: 50 INJECTION INTRAMUSCULAR; INTRAVENOUS at 17:29

## 2024-06-17 RX ADMIN — FENTANYL CITRATE 25 MCG: 50 INJECTION INTRAMUSCULAR; INTRAVENOUS at 15:47

## 2024-06-17 RX ADMIN — ROCURONIUM BROMIDE 50 MG: 10 INJECTION, SOLUTION INTRAVENOUS at 12:56

## 2024-06-17 RX ADMIN — LIDOCAINE HYDROCHLORIDE 100 MG: 10 INJECTION, SOLUTION EPIDURAL; INFILTRATION; INTRACAUDAL; PERINEURAL at 12:55

## 2024-06-17 RX ADMIN — FENTANYL CITRATE 25 MCG: 50 INJECTION INTRAMUSCULAR; INTRAVENOUS at 17:23

## 2024-06-17 RX ADMIN — FENTANYL CITRATE 100 MCG: 50 INJECTION INTRAMUSCULAR; INTRAVENOUS at 12:55

## 2024-06-17 RX ADMIN — CEFAZOLIN SODIUM 2000 MG: 2 SOLUTION INTRAVENOUS at 13:05

## 2024-06-17 RX ADMIN — SUGAMMADEX 200 MG: 100 INJECTION, SOLUTION INTRAVENOUS at 16:08

## 2024-06-17 RX ADMIN — SODIUM CHLORIDE, SODIUM LACTATE, POTASSIUM CHLORIDE, AND CALCIUM CHLORIDE 125 ML/HR: .6; .31; .03; .02 INJECTION, SOLUTION INTRAVENOUS at 18:23

## 2024-06-17 RX ADMIN — ONDANSETRON 4 MG: 2 INJECTION INTRAMUSCULAR; INTRAVENOUS at 15:09

## 2024-06-17 RX ADMIN — ACETAMINOPHEN 650 MG: 325 TABLET, FILM COATED ORAL at 18:22

## 2024-06-17 RX ADMIN — EPHEDRINE SULFATE 15 MG: 50 INJECTION, SOLUTION INTRAVENOUS at 13:26

## 2024-06-17 NOTE — INTERVAL H&P NOTE
H&P reviewed. After examining the patient I find no changes in the patients condition since the H&P had been written.    Vitals:    06/17/24 0931   BP: 141/81   Pulse: (!) 50   Resp: 16   Temp: (!) 96.9 °F (36.1 °C)   SpO2: 97%

## 2024-06-17 NOTE — ANESTHESIA PREPROCEDURE EVALUATION
Procedure:  NEPHRECTOMY RADICAL  W/ ROBOTICS (Right: Abdomen)    Relevant Problems   No relevant active problems        Physical Exam    Airway    Mallampati score: III  TM Distance: >3 FB  Neck ROM: full     Dental   No notable dental hx     Cardiovascular  Cardiovascular exam normal    Pulmonary  Pulmonary exam normal     Other Findings        Anesthesia Plan  ASA Score- 2     Anesthesia Type- general with ASA Monitors.         Additional Monitors:     Airway Plan: ETT.           Plan Factors-Exercise tolerance (METS): >4 METS.    Chart reviewed. EKG reviewed.  Existing labs reviewed. Patient summary reviewed.    Patient is not a current smoker.              Induction- intravenous.    Postoperative Plan- Plan for postoperative opioid use.         Informed Consent- Anesthetic plan and risks discussed with patient.

## 2024-06-17 NOTE — PLAN OF CARE
Problem: PAIN - ADULT  Goal: Verbalizes/displays adequate comfort level or baseline comfort level  Description: Interventions:  - Encourage patient to monitor pain and request assistance  - Assess pain using appropriate pain scale  - Administer analgesics based on type and severity of pain and evaluate response  - Implement non-pharmacological measures as appropriate and evaluate response  - Consider cultural and social influences on pain and pain management  - Notify physician/advanced practitioner if interventions unsuccessful or patient reports new pain  Outcome: Progressing     Problem: INFECTION - ADULT  Goal: Absence or prevention of progression during hospitalization  Description: INTERVENTIONS:  - Assess and monitor for signs and symptoms of infection  - Monitor lab/diagnostic results  - Monitor all insertion sites, i.e. indwelling lines, tubes, and drains  - Monitor endotracheal if appropriate and nasal secretions for changes in amount and color  - [place] appropriate cooling/warming therapies per order  - Administer medications as ordered  - Instruct and encourage patient and family to use good hand hygiene technique  - Identify and instruct in appropriate isolation precautions for identified infection/condition  Outcome: Progressing     Problem: SAFETY ADULT  Goal: Patient will remain free of falls  Description: INTERVENTIONS:  - Educate patient/family on patient safety including physical limitations  - Instruct patient to call for assistance with activity   - Consult OT/PT to assist with strengthening/mobility   - Keep Call bell within reach  - Keep bed low and locked with side rails adjusted as appropriate  - Keep care items and personal belongings within reach  - Initiate and maintain comfort rounds  - Make Fall Risk Sign visible to staff  - Offer Toileting every 2 Hours, in advance of need  - Initiate/Maintain bed alarm  - Apply yellow socks and bracelet for high fall risk patients  - Consider  moving patient to room near nurses station  Outcome: Progressing  Goal: Maintain or return to baseline ADL function  Description: INTERVENTIONS:  -  Assess patient's ability to carry out ADLs; assess patient's baseline for ADL function and identify physical deficits which impact ability to perform ADLs (bathing, care of mouth/teeth, toileting, grooming, dressing, etc.)  - Assess/evaluate cause of self-care deficits   - Assess range of motion  - Assess patient's mobility; develop plan if impaired  - Assess patient's need for assistive devices and provide as appropriate  - Encourage maximum independence but intervene and supervise when necessary  - Involve family in performance of ADLs  - Assess for home care needs following discharge   - Consider OT consult to assist with ADL evaluation and planning for discharge  - Provide patient education as appropriate  Outcome: Progressing  Goal: Maintains/Returns to pre admission functional level  Description: INTERVENTIONS:  - Perform AM-PAC 6 Click Basic Mobility/ Daily Activity assessment daily.  - Set and communicate daily mobility goal to care team and patient/family/caregiver.   - Collaborate with rehabilitation services on mobility goals if consulted  - Ambulate patient 3 times a day  - Out of bed to chair 3 times a day   - Out of bed for meals 3 times a day  - Out of bed for toileting  - Record patient progress and toleration of activity level   Outcome: Progressing     Problem: DISCHARGE PLANNING  Goal: Discharge to home or other facility with appropriate resources  Description: INTERVENTIONS:  - Identify barriers to discharge w/patient and caregiver  - Arrange for needed discharge resources and transportation as appropriate  - Identify discharge learning needs (meds, wound care, etc.)  - Arrange for interpretive services to assist at discharge as needed  - Refer to Case Management Department for coordinating discharge planning if the patient needs post-hospital  services based on physician/advanced practitioner order or complex needs related to functional status, cognitive ability, or social support system  Outcome: Progressing     Problem: Knowledge Deficit  Goal: Patient/family/caregiver demonstrates understanding of disease process, treatment plan, medications, and discharge instructions  Description: Complete learning assessment and assess knowledge base.  Interventions:  - Provide teaching at level of understanding  - Provide teaching via preferred learning methods  Outcome: Progressing     Problem: GENITOURINARY - ADULT  Goal: Maintains or returns to baseline urinary function  Description: INTERVENTIONS:  - Assess urinary function  - Encourage oral fluids to ensure adequate hydration if ordered  - Administer IV fluids as ordered to ensure adequate hydration  - Administer ordered medications as needed  - Offer frequent toileting  - Follow urinary retention protocol if ordered  Outcome: Progressing  Goal: Absence of urinary retention  Description: INTERVENTIONS:  - Assess patient’s ability to void and empty bladder  - Monitor I/O  - Bladder scan as needed  - Discuss with physician/AP medications to alleviate retention as needed  - Discuss catheterization for long term situations as appropriate  Outcome: Progressing  Goal: Urinary catheter remains patent  Description: INTERVENTIONS:  - Assess patency of urinary catheter  - If patient has a chronic luis, consider changing catheter if non-functioning  - Follow guidelines for intermittent irrigation of non-functioning urinary catheter  Outcome: Progressing     Problem: SKIN/TISSUE INTEGRITY - ADULT  Goal: Skin Integrity remains intact(Skin Breakdown Prevention)  Description: Assess:  -Perform Silas assessment every shift  -Clean and moisturize skin every shift  -Inspect skin when repositioning, toileting, and assisting with ADLS  -Assess under medical devices such as fernando sleeve every shift  -Assess extremities for  adequate circulation and sensation     Bed Management:  -Have minimal linens on bed & keep smooth, unwrinkled  -Change linens as needed when moist or perspiring  -Avoid sitting or lying in one position for more than 2 hours while in bed  -Keep HOB at 30degrees     Toileting:  -Offer bedside commode    Activity:  -Mobilize patient 4 times a day  -Encourage activity and walks on unit  -Instruct/ Assist with weight shifting every 2 hours when out of bed in chair  -Consider limitation of chair time 2 hour intervals    Skin Care:  -Avoid use of baby powder, tape, friction and shearing, hot water or constrictive clothing    Next Steps:  -Teach patient strategies to minimize risks such as incentive spirometer   -Consider consults to  interdisciplinary teams such as n/a  Outcome: Progressing  Goal: Incision(s), wounds(s) or drain site(s) healing without S/S of infection  Description: INTERVENTIONS  - Assess and document dressing, incision, wound bed, drain sites and surrounding tissue  - Provide patient and family education  - Perform skin care/dressing changes every   Outcome: Progressing

## 2024-06-17 NOTE — ANESTHESIA POSTPROCEDURE EVALUATION
"Post-Op Assessment Note    CV Status:  Stable  Pain Score: 0    Pain management: adequate       Mental Status:  Sleepy and arousable   PONV Controlled:  None   Airway Patency:  Patent  Two or more mitigation strategies used for obstructive sleep apnea. There is a medical reason for not screening for obstructive sleep apnea and/or for not using two or more mitigation strategies   Post Op Vitals Reviewed: Yes    No anethesia notable event occurred.    Staff: CRNA               BP      Temp (P) 98.3 °F (36.8 °C) (06/17/24 1655)    Pulse     Resp   14   SpO2   98% 2L NC   /81   Pulse (!) 50   Temp (P) 98.3 °F (36.8 °C)   Resp 16   Ht 5' 6\" (1.676 m)   Wt 102 kg (225 lb 5 oz)   SpO2 97%   BMI 36.37 kg/m²     "

## 2024-06-17 NOTE — PLAN OF CARE
Problem: PAIN - ADULT  Goal: Verbalizes/displays adequate comfort level or baseline comfort level  Description: Interventions:  - Encourage patient to monitor pain and request assistance  - Assess pain using appropriate pain scale  - Administer analgesics based on type and severity of pain and evaluate response  - Implement non-pharmacological measures as appropriate and evaluate response  - Consider cultural and social influences on pain and pain management  - Notify physician/advanced practitioner if interventions unsuccessful or patient reports new pain  Outcome: Progressing     Problem: INFECTION - ADULT  Goal: Absence or prevention of progression during hospitalization  Description: INTERVENTIONS:  - Assess and monitor for signs and symptoms of infection  - Monitor lab/diagnostic results  - Monitor all insertion sites, i.e. indwelling lines, tubes, and drains  - Monitor endotracheal if appropriate and nasal secretions for changes in amount and color  - [place] appropriate cooling/warming therapies per order  - Administer medications as ordered  - Instruct and encourage patient and family to use good hand hygiene technique  - Identify and instruct in appropriate isolation precautions for identified infection/condition  Outcome: Progressing     Problem: SAFETY ADULT  Goal: Patient will remain free of falls  Description: INTERVENTIONS:  - Educate patient/family on patient safety including physical limitations  - Instruct patient to call for assistance with activity   - Consult OT/PT to assist with strengthening/mobility   - Keep Call bell within reach  - Keep bed low and locked with side rails adjusted as appropriate  - Keep care items and personal belongings within reach  - Initiate and maintain comfort rounds  - Make Fall Risk Sign visible to staff  - Offer Toileting every 2 Hours, in advance of need  - Initiate/Maintain bed alarm  - Apply yellow socks and bracelet for high fall risk patients  - Consider  moving patient to room near nurses station  Outcome: Progressing  Goal: Maintain or return to baseline ADL function  Description: INTERVENTIONS:  -  Assess patient's ability to carry out ADLs; assess patient's baseline for ADL function and identify physical deficits which impact ability to perform ADLs (bathing, care of mouth/teeth, toileting, grooming, dressing, etc.)  - Assess/evaluate cause of self-care deficits   - Assess range of motion  - Assess patient's mobility; develop plan if impaired  - Assess patient's need for assistive devices and provide as appropriate  - Encourage maximum independence but intervene and supervise when necessary  - Involve family in performance of ADLs  - Assess for home care needs following discharge   - Consider OT consult to assist with ADL evaluation and planning for discharge  - Provide patient education as appropriate  Outcome: Progressing  Goal: Maintains/Returns to pre admission functional level  Description: INTERVENTIONS:  - Perform AM-PAC 6 Click Basic Mobility/ Daily Activity assessment daily.  - Set and communicate daily mobility goal to care team and patient/family/caregiver.   - Collaborate with rehabilitation services on mobility goals if consulted  - Ambulate patient 3 times a day  - Out of bed to chair 3 times a day   - Out of bed for meals 3 times a day  - Out of bed for toileting  - Record patient progress and toleration of activity level   Outcome: Progressing     Problem: DISCHARGE PLANNING  Goal: Discharge to home or other facility with appropriate resources  Description: INTERVENTIONS:  - Identify barriers to discharge w/patient and caregiver  - Arrange for needed discharge resources and transportation as appropriate  - Identify discharge learning needs (meds, wound care, etc.)  - Arrange for interpretive services to assist at discharge as needed  - Refer to Case Management Department for coordinating discharge planning if the patient needs post-hospital  services based on physician/advanced practitioner order or complex needs related to functional status, cognitive ability, or social support system  Outcome: Progressing     Problem: Knowledge Deficit  Goal: Patient/family/caregiver demonstrates understanding of disease process, treatment plan, medications, and discharge instructions  Description: Complete learning assessment and assess knowledge base.  Interventions:  - Provide teaching at level of understanding  - Provide teaching via preferred learning methods  Outcome: Progressing     Problem: GENITOURINARY - ADULT  Goal: Maintains or returns to baseline urinary function  Description: INTERVENTIONS:  - Assess urinary function  - Encourage oral fluids to ensure adequate hydration if ordered  - Administer IV fluids as ordered to ensure adequate hydration  - Administer ordered medications as needed  - Offer frequent toileting  - Follow urinary retention protocol if ordered  Outcome: Progressing  Goal: Absence of urinary retention  Description: INTERVENTIONS:  - Assess patient’s ability to void and empty bladder  - Monitor I/O  - Bladder scan as needed  - Discuss with physician/AP medications to alleviate retention as needed  - Discuss catheterization for long term situations as appropriate  Outcome: Progressing  Goal: Urinary catheter remains patent  Description: INTERVENTIONS:  - Assess patency of urinary catheter  - If patient has a chronic luis, consider changing catheter if non-functioning  - Follow guidelines for intermittent irrigation of non-functioning urinary catheter  Outcome: Progressing     Problem: SKIN/TISSUE INTEGRITY - ADULT  Goal: Skin Integrity remains intact(Skin Breakdown Prevention)  Description: Assess:  -Perform Silas assessment every shift  -Clean and moisturize skin every shift  -Inspect skin when repositioning, toileting, and assisting with ADLS  -Assess under medical devices such as fernando sleeve every shift  -Assess extremities for  adequate circulation and sensation     Bed Management:  -Have minimal linens on bed & keep smooth, unwrinkled  -Change linens as needed when moist or perspiring  -Avoid sitting or lying in one position for more than 2 hours while in bed  -Keep HOB at 30degrees     Toileting:  -Offer bedside commode    Activity:  -Mobilize patient 4 times a day  -Encourage activity and walks on unit  -Instruct/ Assist with weight shifting every 2 hours when out of bed in chair  -Consider limitation of chair time 2 hour intervals    Skin Care:  -Avoid use of baby powder, tape, friction and shearing, hot water or constrictive clothing    Next Steps:  -Teach patient strategies to minimize risks such as incentive spirometer   -Consider consults to  interdisciplinary teams such as n/a  Outcome: Progressing  Goal: Incision(s), wounds(s) or drain site(s) healing without S/S of infection  Description: INTERVENTIONS  - Assess and document dressing, incision, wound bed, drain sites and surrounding tissue  - Provide patient and family education  - Perform skin care/dressing changes every day  Outcome: Progressing

## 2024-06-17 NOTE — OP NOTE
OPERATIVE REPORT  PATIENT NAME: Pj Vences    :  1975  MRN: [id number]  Pt Location: [facility] OR ROOM 07    SURGERY DATE: 2024    Surgeons and Role:     * Chapincito Corea MD - Primary     * Jd Blanco PA-C - Assisting     * Charles Diaz MD - Assisting    Preop Diagnosis:  Right renal mass [N28.89]    Post-Op Diagnosis Codes:     * Right renal mass [N28.89]    Procedure(s):  Right - NEPHRECTOMY RADICAL  W/ ROBOTICS    Specimen(s):  ID Type Source Tests Collected by Time Destination   1 :  Tissue Kidney, Right TISSUE EXAM Chapincito Corea MD 2024  2:38 PM        Estimated Blood Loss:   250 mL    Drains:  Closed/Suction Drain Right Abdomen Bulb 10 Fr. (Active)   Number of days: 0       Urethral Catheter Latex 16 Fr. (Active)   Number of days: 0       Anesthesia Type:   General    Operative Indications:  Right renal mass [N28.89]    Operative Findings:  7 cm right renal mass  Two renal arteries and two renal veins      Complications:   None    Procedure and Technique:  Pj Vences is a 49-year-old man with a  7 cm right renal mass suspicious for a renal cell carcinoma. Risk and benefits of radical nephrectomy were discussed and reviewed. The patient opted to undergo [place] robot-assisted laparoscopic radical nephrectomy. Informed consent was obtained. The patient was brought to the operating room on 2024.     After the smooth induction of general endotracheal anesthesia, patient was placed in a modified right lateral decubitus position. Intravenous antibiotics were administered. A timeout was performed with all members of the operative team confirming the patient's identity, procedure to be performed, and laterality of the case.     Appropriate anesthetic monitoring lines were placed. A bean bag was used to secure the patient in a modified flank position with the right side up. A Rausch catheter was inserted at the start of the case. The bottom leg was gently flexed. VenOodlee  boots had been applied. All pressure points were padded with pink pads. The top leg was placed straight. The right arm was brought across the patient's body and supported with a thoracic arm . A hand was placed easily into the axilla and an axillary roll was not required. The bed had been flexed and the kidney rest elevated. The patient was secured to the bed with towels padding and tape.     A Veress needle was placed in the right mid abdomen.  A pneumoperitoneum was created.  “Straight line” ports were placed approximately 4 fingerbreadths lateral to the umbilicus.  The 1st port was placed 1-2 fingerbreadths below the costal margin.  His next 3 for ports were then placed along the same line approximately 4 fingerbreadths from 1 another.  A 12 mm air seal assistant port was placed between the camera port and the right robotic arm. The patient was rolled to a near vertical position. The robot was docked.     The colon was reflected medially along the white line of [place]. The duodenum was identified and kocherized.  The IVF was identified posterior to the duodenum.  The gonadal vein and ureter were identified in the retroperitoneum.  A posterior plan was developed between the psoas muscle and the ureter and gonadal vein and carried cephalad toward the lower pole of the kidney and the renal hilum.  The gonadal vein was skeletonized, ligated with Weck clips and transected.  The kidney was elevated with the 4th arm of the robot and dissection was performed in the region of the hilum.  Two renal veins were identified as well as two renal arteries.  All there hilar vessels were skeletonized.       The renal hilum was transected with the laparoscopic Ethicon stapler using three 45 mm vascular loads.  There was excellent hemostasis after transection of the renal hilum.  The upper pole of the kidney was then mobilized off the liver with the Vessel Sealer.  The lateral attachments were transected combination of sharp  dissection and cautery.  The ureter was transected with a fourth 45 mm vascular load of the stapler.       The hilum was inspected with excellent hemostasis.  Tisseal was applied to the hilum and the bed of the adrenal gland.      A tiny punctate hole in the gallbladder was identified and closed with a figure of eight 3-0 Vicryl suture.  A 10-German Chris drain was placed.       The robot was undocked. The 12 mm port was opened to facilitate removal of the specimen by hand. . The fascia in the midline was closed with two running #1 looped PDS sutures. All 8 mm robotic ports were then irrigated and the skin was closed with 4-0 Monocryl and histacryl. The patient was placed supine. Overall the patient tolerated the procedure well and were no complications.     He was extubated in the operating room and transferred to the PACU in stable condition at the conclusion of the case.     No qualified  was available for this case.  The advanced practitioner was a necessary and qualified assistant for help with port placement, laparoscopic exposure, passage of instruments and sutures, suctioning and retraction.       I was present for the entire procedure.    Patient Disposition:  PACU         SIGNATURE: Chapincito Corea MD  DATE: June 17, 2024  TIME: 4:26 PM

## 2024-06-17 NOTE — TELEPHONE ENCOUNTER
Completed right robotic radical nephrectomy, currently admitted to hospital, has postop appointment scheduled for 7/5/2024

## 2024-06-18 LAB
ANION GAP SERPL CALCULATED.3IONS-SCNC: 8 MMOL/L (ref 4–13)
BUN SERPL-MCNC: 13 MG/DL (ref 5–25)
CALCIUM SERPL-MCNC: 8.9 MG/DL (ref 8.4–10.2)
CHLORIDE SERPL-SCNC: 105 MMOL/L (ref 96–108)
CO2 SERPL-SCNC: 23 MMOL/L (ref 21–32)
CREAT SERPL-MCNC: 1.18 MG/DL (ref 0.6–1.3)
ERYTHROCYTE [DISTWIDTH] IN BLOOD BY AUTOMATED COUNT: 13.6 % (ref 11.6–15.1)
GFR SERPL CREATININE-BSD FRML MDRD: 71 ML/MIN/1.73SQ M
GLUCOSE SERPL-MCNC: 130 MG/DL (ref 65–140)
HCT VFR BLD AUTO: 35.9 % (ref 36.5–49.3)
HGB BLD-MCNC: 12.1 G/DL (ref 12–17)
MCH RBC QN AUTO: 27.9 PG (ref 26.8–34.3)
MCHC RBC AUTO-ENTMCNC: 33.7 G/DL (ref 31.4–37.4)
MCV RBC AUTO: 83 FL (ref 82–98)
PLATELET # BLD AUTO: 211 THOUSANDS/UL (ref 149–390)
PMV BLD AUTO: 11.4 FL (ref 8.9–12.7)
POTASSIUM SERPL-SCNC: 3.6 MMOL/L (ref 3.5–5.3)
RBC # BLD AUTO: 4.34 MILLION/UL (ref 3.88–5.62)
SODIUM SERPL-SCNC: 136 MMOL/L (ref 135–147)
WBC # BLD AUTO: 9.6 THOUSAND/UL (ref 4.31–10.16)

## 2024-06-18 PROCEDURE — 80048 BASIC METABOLIC PNL TOTAL CA: CPT | Performed by: UROLOGY

## 2024-06-18 PROCEDURE — 99024 POSTOP FOLLOW-UP VISIT: CPT

## 2024-06-18 PROCEDURE — 85027 COMPLETE CBC AUTOMATED: CPT | Performed by: UROLOGY

## 2024-06-18 RX ADMIN — HYDROMORPHONE HYDROCHLORIDE 0.5 MG: 1 INJECTION, SOLUTION INTRAMUSCULAR; INTRAVENOUS; SUBCUTANEOUS at 23:26

## 2024-06-18 RX ADMIN — ACETAMINOPHEN 650 MG: 325 TABLET, FILM COATED ORAL at 01:53

## 2024-06-18 RX ADMIN — OXYCODONE HYDROCHLORIDE AND ACETAMINOPHEN 1 TABLET: 5; 325 TABLET ORAL at 16:32

## 2024-06-18 RX ADMIN — DOCUSATE SODIUM 100 MG: 100 CAPSULE, LIQUID FILLED ORAL at 07:53

## 2024-06-18 RX ADMIN — SODIUM CHLORIDE, SODIUM LACTATE, POTASSIUM CHLORIDE, AND CALCIUM CHLORIDE 125 ML/HR: .6; .31; .03; .02 INJECTION, SOLUTION INTRAVENOUS at 01:53

## 2024-06-18 RX ADMIN — HYDROMORPHONE HYDROCHLORIDE 0.5 MG: 1 INJECTION, SOLUTION INTRAMUSCULAR; INTRAVENOUS; SUBCUTANEOUS at 03:18

## 2024-06-18 RX ADMIN — OXYCODONE HYDROCHLORIDE AND ACETAMINOPHEN 1 TABLET: 5; 325 TABLET ORAL at 01:53

## 2024-06-18 RX ADMIN — DOCUSATE SODIUM 100 MG: 100 CAPSULE, LIQUID FILLED ORAL at 16:33

## 2024-06-18 RX ADMIN — ENOXAPARIN SODIUM 40 MG: 40 INJECTION SUBCUTANEOUS at 07:54

## 2024-06-18 RX ADMIN — HYDROMORPHONE HYDROCHLORIDE 0.5 MG: 1 INJECTION, SOLUTION INTRAMUSCULAR; INTRAVENOUS; SUBCUTANEOUS at 07:52

## 2024-06-18 RX ADMIN — OXYCODONE HYDROCHLORIDE AND ACETAMINOPHEN 2 TABLET: 5; 325 TABLET ORAL at 06:24

## 2024-06-18 RX ADMIN — BACITRACIN ZINC, NEOMYCIN, POLYMYXIN B 1 SMALL APPLICATION: 400; 3.5; 5 OINTMENT TOPICAL at 07:56

## 2024-06-18 RX ADMIN — OXYCODONE HYDROCHLORIDE AND ACETAMINOPHEN 2 TABLET: 5; 325 TABLET ORAL at 11:29

## 2024-06-18 RX ADMIN — OXYCODONE HYDROCHLORIDE AND ACETAMINOPHEN 2 TABLET: 5; 325 TABLET ORAL at 20:45

## 2024-06-18 RX ADMIN — ACETAMINOPHEN 650 MG: 325 TABLET, FILM COATED ORAL at 06:24

## 2024-06-18 NOTE — DISCHARGE INSTR - AVS FIRST PAGE
Follow-up office visit has been scheduled with Dr. Corea on 7/5/24  [place] for pain and Colace to avoid constipation has been sent to patient's pharmacy  Ambulate often avoid prolonged sitting or laying  Continue regular diet  Incisions:            - Can shower after 48 hours must have incisions dry after            - Do not bathe or soak incisions for about 2 weeks, no swimming or hot tubs            - Surgical glue should not be missed with, will discolor and flake off on its own            - Sutures should dissolve in 4 to 6 weeks  No heavy lifting or heavy exercise for 4 weeks  SONNY drain incision will heal from inside out and about 3 days.  Keep area covered with dry gauze and tape, dressing should be changed if it gets wet or saturated with blood.  Call office with any concerns or questions you may have prior to scheduled appointment

## 2024-06-18 NOTE — UTILIZATION REVIEW
Initial Clinical Review    Elective Inpatient surgical procedure  Age/Sex: 49 y.o. male with R renal mass concerning for RCC  Surgery Date: 6/17/24 @ 4358   Procedure: Right - NEPHRECTOMY RADICAL W/ ROBOTICS   Anesthesia: general   Operative Findings: 7 cm right renal mass . Two renal arteries and two renal veins     ml     POD#1 Progress Note: 6/18   Pt  reports ongoing abdominal pain, mainly at SONNY insertion site  , pain 10/10 this am . . Pt receiving prn IV Dilaudid , PRN Percocet for pain . RLQ SONNY with  95 cc output yesterday, 25 cc red serosanguineous output 6/18, maintain until cleared for discharge . Luis cath patent clear yellow urine ,  luis to be d/c'ed today with voiding trial . Creatinine stable at 1.18 . Diet advanced to surgical soft . IVF infusing . Encouraged ambulation .      Admission Orders: Date/Time/Statement:   Admission Orders (From admission, onward)       Ordered        06/17/24 1644  Inpatient Admission  Once                          Orders Placed This Encounter   Procedures    Inpatient Admission     Standing Status:   Standing     Number of Occurrences:   1     Order Specific Question:   Level of Care     Answer:   Med Surg [16]     Order Specific Question:   Estimated length of stay     Answer:   Inpatient Only Surgery       Vital Signs (last 3 days)       Date/Time Temp Pulse Resp BP MAP (mmHg) SpO2 Calculated FIO2 (%) - Nasal Cannula Nasal Cannula O2 Flow Rate (L/min) O2 Device Patient Position - Orthostatic VS Rosalina Coma Scale Score Pain    06/18/24 0752 -- -- -- -- -- -- -- -- -- -- 15 10 - Worst Possible Pain    06/18/24 07:30:16 97.8 °F (36.6 °C) 54 18 139/73 95 97 % -- -- -- Lying -- --    06/18/24 0624 -- -- -- -- -- -- -- -- -- -- -- 6    06/18/24 0318 -- -- -- -- -- -- -- -- -- -- -- 9    06/18/24 03:14:53 97.8 °F (36.6 °C) 61 24 124/72 89 98 % -- -- None (Room air) Lying -- --    06/18/24 0153 -- -- -- -- -- -- -- -- -- -- -- 5    06/17/24 23:11:05 98 °F (36.7 °C) 72  20 114/57 76 97 % 28 2 L/min Nasal cannula Lying -- --    06/17/24 2100 -- 66 -- 140/74 96 99 % -- -- -- -- -- --    06/17/24 1931 -- -- -- -- -- -- -- -- -- -- -- 8 06/17/24 1922 97.5 °F (36.4 °C) 93 16 -- -- 97 % 28 2 L/min Nasal cannula -- -- --    06/17/24 1900 -- 76 -- 136/71 93 98 % -- -- -- Lying -- --    06/17/24 1830 -- 70 -- 132/75 94 94 % -- -- -- -- -- --    06/17/24 18:26:27 97.8 °F (36.6 °C) 77 16 132/75 94 96 % 28 2 L/min Nasal cannula Lying -- --    06/17/24 1822 -- -- -- -- -- -- -- -- -- -- -- 7 06/17/24 1755 98.2 °F (36.8 °C) 72 13 127/71 93 98 % 28 2 L/min Nasal cannula -- -- 6    06/17/24 1745 -- 78 17 -- -- 98 % 28 2 L/min Nasal cannula -- -- 7 06/17/24 1740 -- 76 17 123/70 92 97 % 28 2 L/min Nasal cannula -- -- --    06/17/24 1729 -- 72 17 119/68 -- 97 % 28 2 L/min Nasal cannula -- -- 8 06/17/24 1723 -- 74 18 115/65 85 98 % 28 2 L/min Nasal cannula -- -- 9    06/17/24 1655 98.3 °F (36.8 °C) 74 16 116/59 82 99 % 28 2 L/min Nasal cannula -- -- --    06/17/24 1118 -- -- -- -- -- -- -- -- -- -- -- No Pain    06/17/24 1000 -- -- -- -- -- -- -- -- -- -- -- 2    06/17/24 0931 96.9 °F (36.1 °C) 50 16 141/81 -- 97 % -- -- None (Room air) -- -- --          Weight (last 2 days)       Date/Time Weight    06/17/24 1000 102 (225.31)            Pertinent Labs/Diagnostic Test Results:   Radiology:  No orders to display     Cardiology:  No orders to display     GI:  No orders to display           Results from last 7 days   Lab Units 06/18/24  0507 06/17/24  1720   WBC Thousand/uL 9.60 15.25*   HEMOGLOBIN g/dL 12.1 13.0   HEMATOCRIT % 35.9* 39.2   PLATELETS Thousands/uL 211 210         Results from last 7 days   Lab Units 06/18/24  0507 06/17/24  1720   SODIUM mmol/L 136 139   POTASSIUM mmol/L 3.6 3.5   CHLORIDE mmol/L 105 108   CO2 mmol/L 23 24   ANION GAP mmol/L 8 7   BUN mg/dL 13 13   CREATININE mg/dL 1.18 1.09   EGFR ml/min/1.73sq m 71 78   CALCIUM mg/dL 8.9 8.5             Results from last 7  days   Lab Units 06/18/24  0507 06/17/24  1720   GLUCOSE RANDOM mg/dL 130 145*             Diet: surgical soft diet  6/18 am   Mobility: Up as allyson  DVT Prophylaxis: SCD, ambulation, Lovenox     Medications/Pain Control:   Scheduled Medications:  acetaminophen, 650 mg, Oral, Q6H JENI  docusate sodium, 100 mg, Oral, BID  enoxaparin, 40 mg, Subcutaneous, Daily  neomycin-bacitracin-polymyxin b, 1 small application, Topical, BID    ceFAZolin (ANCEF) IVPB (premix in dextrose) 2,000 mg 50 mL  Dose: 2,000 mg  Freq: Once Route: IV  Start: 06/17/24 0930 End: 06/17/24 1305  Continuous IV Infusions:  lactated ringers, 125 mL/hr, Intravenous, Continuous      PRN Meds:  HYDROmorphone, 0.5 mg, Intravenous, Q2H PRN x2 6/18   lactated ringers, 1,000 mL, Intravenous, Once PRN   And  lactated ringers, 1,000 mL, Intravenous, Once PRN  ondansetron, 4 mg, Intravenous, Q6H PRN  oxyCODONE-acetaminophen, 1 tablet, Oral, Q4H PRN x1 6/18    Or  oxyCODONE-acetaminophen, 2 tablet, Oral, Q4H PRN x1 6/17, x 2 6/18   sodium chloride, 1,000 mL, Intravenous, Once PRN   And  sodium chloride, 1,000 mL, Intravenous, Once PRN    FentaNYL (SUBLIMAZE) injection 25 mcg  Dose: 25 mcg  Freq: Every 5 minutes PRN Route: IV  PRN Reason: Pain - PACU  PRN Comment: Breakthrough - first line  Indications of Use: PROCEDURAL PAIN  Start: 06/17/24 1705 End: 06/17/24 1813 x3 6/17           Network Utilization Review Department  ATTENTION: Please call with any questions or concerns to [phone] and carefully listen to the prompts so that you are directed to the right person. All voicemails are confidential.   For Discharge needs, contact Care Management DC Support Team at [phone] opt. [phone]  Send all requests for admission clinical reviews, approved or denied determinations and any other requests to dedicated fax number below belonging to the campus where the patient is receiving treatment. List of dedicated fax numbers for the Facilities:  FACILITY NAME UR FAX  NUMBER   ADMISSION DENIALS (Administrative/Medical Necessity) [phone]   DISCHARGE SUPPORT TEAM (NETWORK) [phone]   PARENT CHILD HEALTH (Maternity/NICU/Pediatrics) [phone]   [facility] [phone]   [facility] [phone]   [facility] [phone]   [facility] [phone]   [facility] [phone]   [facility] [phone]   [facility] [phone]   [facility] [phone]   [facility] [phone]   [facility] [phone]   [facility] [phone]   [facility] ([place], [place]) [phone]

## 2024-06-18 NOTE — ASSESSMENT & PLAN NOTE
Incidental finding of 7 cm right renal mass concerning for RCC on CT chest from 6/9/2024  POD #2 s/p radical robotic right nephrectomy by Dr. Corea  No intraoperative complications reported  Rausch catheter removed 6/19 - urinating independently, clear and yellow  SONNY drain removed 6/19  Abdominal pain has improved, surgical scars are clean and dry  Right shoulder pain ongoing (mild)  WBC stable 9.6  Creatinine 1.3  Educated on the importance of OOB/ambulation as much as possible  Clear for discharge this afternoon - follow up with Dr. Corea 7/5/24

## 2024-06-18 NOTE — TELEPHONE ENCOUNTER
Pt is currently admitted. Continue to monitor for discharge.     *Pt has path review scheduled with MM on 7/5/24.

## 2024-06-18 NOTE — QUICK NOTE
Patient seen to discuss D/C home today. Rausch catheter was removed today.  Still due to void.  SONNY drain was removed.  Patient still experiencing pain in right lower quadrant that radiates to upper right shoulder.  PRN medication available.  Plan to watch overnight given the patient lives in [place], still due to void, and ongoing pain.  BMP ordered for the morning. Patient agreeable with plan.  Plan for son to pick him up if discharged tomorrow.

## 2024-06-18 NOTE — PROGRESS NOTES
Progress Note - Urology  Pj Ru 1975, 49 y.o. male MRN: [id number]    Unit/Bed#: E5 -01 Encounter: [id number]      * Right renal mass  Assessment & Plan  Incidental finding of 7 cm right renal mass concerning for RCC on CT chest from 6/9/2024  POD #1 s/p radical robotic right nephrectomy by Dr. Corea  No intraoperative complications reported  Patient reports ongoing pain at SONNY insertion site   Diet advanced to surgical soft, denies nausea/vomiting  VSS, afebrile  Creatinine stable at 1.18  Discontinue Rausch catheter, trial of void will take place this afternoon  SONNY drain 25 cc red serosanguineous output 6/18, maintain until cleared for discharge  Educated on the importance of OOB/ambulation as much as possible  Urology will continue to monitor and determine appropriate discharge timing      Subjective:   Pj is a 49-year-old [place]ian speaking male POD #1 s/p radical robotic right nephrectomy by Dr. Corea for a 7 cm right renal mass concerning for RCC.  No complications reported overnight, patient has remained afebrile.  Patient reports pain throughout the abdomen mainly at SONNY drain insertion site.  Patient denies fever/chills, chest pain, SOB, pain/swelling in the lower extremities, or bladder pain.  SONNY drain remains intact, 95 cc output yesterday, 25 cc red serosanguinous output today.  SONNY drain will remain in place until patient is cleared for discharge.  Rausch catheter also remains in place, draining clear yellow urine.  Rausch catheter will be removed today, trial of void will take place this afternoon.  Patient diet advanced to surgical soft.  Patient educated to ambulate as much as possible to help decrease overall pain level and encouraged her bowels to start moving.  Patient would like to ambulate and see how he feels to determine if he is ready to go home or if he would like to remain for further pain management.     Review of Systems   Constitutional:  Negative for activity change,  chills, fatigue and fever.   Respiratory:  Negative for apnea, cough and shortness of breath.    Cardiovascular:  Negative for chest pain and leg swelling.   Gastrointestinal:  Positive for abdominal pain (generalized). Negative for abdominal distention, constipation, diarrhea, nausea and vomiting.   Genitourinary:  Negative for decreased urine volume, difficulty urinating, dysuria, flank pain, frequency, hematuria, penile pain, testicular pain and urgency.   Neurological:  Negative for dizziness and headaches.   Psychiatric/Behavioral: Negative.       CT CHEST WITH IV CONTRAST    INDICATION: N28.89: Other specified disorders of kidney and ureter. 7 cm solid right renal mass suspicious for neoplasm. Staging examination. Former smoker.    COMPARISON: CT abdomen/pelvis from 5/15/2024.    TECHNIQUE: CT examination of the chest was performed. Multiplanar 2D reformatted images were created from the source data.    This examination, like all CT scans performed in the [facility], was performed utilizing techniques to minimize radiation dose exposure, including the use of iterative reconstruction and automated exposure control. Radiation dose length  product (DLP) for this visit: 548.36 mGy-cm    IV Contrast: 85 mL of iohexol (OMNIPAQUE)    FINDINGS:    LUNGS: Lungs are clear. There is no tracheal or endobronchial lesion.    PLEURA: Unremarkable.    HEART/GREAT VESSELS: Heart is unremarkable for patient's age. No thoracic aortic aneurysm.    MEDIASTINUM AND GENA: Unremarkable.    CHEST WALL AND LOWER NECK: Left thyroid nodule measuring 1.2 cm. Incidental discovery of one or more thyroid nodule(s) measuring less than 1.5 cm and without suspicious features is noted in this patient who is above 35 years old; according to guidelines  published in the February 2015 white paper on incidental thyroid nodules in the Journal of the American College of Radiology (JACR), no further evaluation is  "recommended.    VISUALIZED STRUCTURES IN THE UPPER ABDOMEN: 7 cm right renal mass suspicious for neoplasm. Fatty liver. Stable subcentimeter hypodensity in the left lobe of the liver measuring 7 mm upper abdomen otherwise unremarkable.    OSSEOUS STRUCTURES: No acute fracture or destructive osseous lesion.   Impression:       No metastatic disease in the chest.    7 cm right renal mass suspicious for neoplasm.    Fatty liver.     Objective:  Vitals: Blood pressure 117/69, pulse 55, temperature 97.8 °F (36.6 °C), temperature source Oral, resp. rate 18, height 5' 6\" (1.676 m), weight 102 kg (225 lb 5 oz), SpO2 95%.,Body mass index is 36.37 kg/m².    Intake/Output Summary (Last 24 hours) at 6/18/2024 1130  Last data filed at 6/18/2024 1059  Gross per 24 hour   Intake 4650 ml   Output 2300 ml   Net 2350 ml     Invasive Devices       Peripheral Intravenous Line  Duration             Peripheral IV 06/17/24 Dorsal (posterior);Left Hand 1 day    Peripheral IV 06/17/24 Right Hand <1 day              Drain  Duration             Closed/Suction Drain Right Abdomen Bulb 10 Fr. <1 day                    Physical Exam  Vitals and nursing note reviewed.   Constitutional:       Appearance: Normal appearance. He is obese.   HENT:      Head: Normocephalic and atraumatic.      Mouth/Throat:      Pharynx: Oropharynx is clear.   Eyes:      Extraocular Movements: Extraocular movements intact.      Conjunctiva/sclera: Conjunctivae normal.   Cardiovascular:      Rate and Rhythm: Normal rate.   Pulmonary:      Effort: Pulmonary effort is normal.   Abdominal:      General: Abdomen is flat. There is no distension.      Palpations: Abdomen is soft.      Tenderness: There is abdominal tenderness (generalized, around surgical site). There is no right CVA tenderness or left CVA tenderness.      Comments: Right lower quadrant SONNY drain remains intact.  25 cc red serosanguineous output 6/18.   Genitourinary:     Penis: Normal.       Comments: Rausch " catheter remains inserted on gravity draining clear yellow urine.  Trial of void will take place this afternoon.  Musculoskeletal:         General: Normal range of motion.      Cervical back: Normal range of motion.   Skin:     General: Skin is warm and dry.   Neurological:      General: No focal deficit present.      Mental Status: He is alert and oriented to person, place, and time.   Psychiatric:         Mood and Affect: Mood normal.         Behavior: Behavior normal.         Labs:  Recent Labs     06/17/24  1720 06/18/24  0507   WBC 15.25* 9.60     Recent Labs     06/17/24  1720 06/18/24  0507   HGB 13.0 12.1       Recent Labs     06/17/24  1720 06/18/24  0507   CREATININE 1.09 1.18       History:    Past Medical History:   Diagnosis Date    Diverticulosis     GERD (gastroesophageal reflux disease)     Renal mass, right     rt nephrectomy today 6/17/2024     Past Surgical History:   Procedure Laterality Date    NO PAST SURGERIES      [place] LAPAROSCOPY RADICAL NEPHRECTOMY Right 6/17/2024    Procedure: NEPHRECTOMY RADICAL  W/ ROBOTICS;  Surgeon: Chapincito Corea MD;  Location: [facility];  Service: Urology     Family History   Problem Relation Age of Onset    Cancer Mother         vaginal    No Known Problems Father      Social History     Socioeconomic History    Marital status: /Civil Union     Spouse name: None    Number of children: None    Years of education: None    Highest education level: None   Occupational History    None   Tobacco Use    Smoking status: Former     Types: Cigarettes    Smokeless tobacco: Never    Tobacco comments:     Was a social smoker, quit in 2019   Vaping Use    Vaping status: Never Used   Substance and Sexual Activity    Alcohol use: Yes     Comment: occasionally, on weekends 1 x    Drug use: Not Currently    Sexual activity: None   Other Topics Concern    None   Social History Narrative    None     Social Determinants of Health     Financial Resource Strain: Not on file    Food Insecurity: No Food Insecurity (5/17/2024)    Hunger Vital Sign     Worried About Running Out of Food in the Last Year: Never true     Ran Out of Food in the Last Year: Never true   Transportation Needs: No Transportation Needs (5/17/2024)    PRAPARE - Transportation     Lack of Transportation (Medical): No     Lack of Transportation (Non-Medical): No   Physical Activity: Not on file   Stress: Not on file   Social Connections: Not on file   Intimate Partner Violence: Not on file   Housing Stability: Low Risk  (5/17/2024)    Housing Stability Vital Sign     Unable to Pay for Housing in the Last Year: No     Number of Times Moved in the Last Year: 0     Homeless in the Last Year: No         Ericka Covington PA-C  Date: 6/18/2024 Time: 11:30 AM

## 2024-06-19 VITALS
TEMPERATURE: 99.3 F | WEIGHT: 225.31 LBS | RESPIRATION RATE: 18 BRPM | BODY MASS INDEX: 36.21 KG/M2 | HEIGHT: 66 IN | HEART RATE: 67 BPM | DIASTOLIC BLOOD PRESSURE: 59 MMHG | OXYGEN SATURATION: 94 % | SYSTOLIC BLOOD PRESSURE: 114 MMHG

## 2024-06-19 LAB
ANION GAP SERPL CALCULATED.3IONS-SCNC: 5 MMOL/L (ref 4–13)
BUN SERPL-MCNC: 14 MG/DL (ref 5–25)
CALCIUM SERPL-MCNC: 8.8 MG/DL (ref 8.4–10.2)
CHLORIDE SERPL-SCNC: 101 MMOL/L (ref 96–108)
CO2 SERPL-SCNC: 29 MMOL/L (ref 21–32)
CREAT SERPL-MCNC: 1.3 MG/DL (ref 0.6–1.3)
GFR SERPL CREATININE-BSD FRML MDRD: 63 ML/MIN/1.73SQ M
GLUCOSE SERPL-MCNC: 109 MG/DL (ref 65–140)
POTASSIUM SERPL-SCNC: 3.8 MMOL/L (ref 3.5–5.3)
SODIUM SERPL-SCNC: 135 MMOL/L (ref 135–147)

## 2024-06-19 PROCEDURE — 80048 BASIC METABOLIC PNL TOTAL CA: CPT | Performed by: PHYSICIAN ASSISTANT

## 2024-06-19 PROCEDURE — 99024 POSTOP FOLLOW-UP VISIT: CPT | Performed by: UROLOGY

## 2024-06-19 PROCEDURE — 88307 TISSUE EXAM BY PATHOLOGIST: CPT | Performed by: PATHOLOGY

## 2024-06-19 PROCEDURE — NC001 PR NO CHARGE: Performed by: UROLOGY

## 2024-06-19 RX ORDER — DOCUSATE SODIUM 100 MG/1
100 CAPSULE, LIQUID FILLED ORAL 2 TIMES DAILY
Qty: 28 CAPSULE | Refills: 0 | Status: SHIPPED | OUTPATIENT
Start: 2024-06-19

## 2024-06-19 RX ORDER — HYDROCODONE BITARTRATE AND ACETAMINOPHEN 5; 325 MG/1; MG/1
1 TABLET ORAL EVERY 6 HOURS PRN
Qty: 8 TABLET | Refills: 0 | Status: SHIPPED | OUTPATIENT
Start: 2024-06-19 | End: 2024-06-29

## 2024-06-19 RX ORDER — ACETAMINOPHEN 325 MG/1
650 TABLET ORAL EVERY 6 HOURS SCHEDULED
Qty: 50 TABLET | Refills: 0 | Status: SHIPPED | OUTPATIENT
Start: 2024-06-19

## 2024-06-19 RX ADMIN — DOCUSATE SODIUM 100 MG: 100 CAPSULE, LIQUID FILLED ORAL at 09:45

## 2024-06-19 RX ADMIN — OXYCODONE HYDROCHLORIDE AND ACETAMINOPHEN 1 TABLET: 5; 325 TABLET ORAL at 06:44

## 2024-06-19 RX ADMIN — ENOXAPARIN SODIUM 40 MG: 40 INJECTION SUBCUTANEOUS at 09:45

## 2024-06-19 NOTE — RESTORATIVE TECHNICIAN NOTE
Restorative Technician Note      Patient Name: Pj Vences     Restorative Tech Visit Date: 06/19/24  Note Type: Mobility  Patient Position Upon Consult: Supine  Activity Performed: Range of motion; Transferred  Patient Position at End of Consult: All needs within reach; Bedside chair

## 2024-06-19 NOTE — PROGRESS NOTES
Progress Note - Urology  Pj Vences 1975, 49 y.o. male MRN: [id number]    Unit/Bed#: E5 -01 Encounter: [id number]      * Right renal mass  Assessment & Plan  Incidental finding of 7 cm right renal mass concerning for RCC on CT chest from 6/9/2024  POD #2 s/p radical robotic right nephrectomy by Dr. Corea  No intraoperative complications reported  Rausch catheter removed 6/19 - urinating independently, clear and yellow  SONNY drain removed 6/19  Abdominal pain has improved, surgical scars are clean and dry  Right shoulder pain ongoing (mild)  WBC stable 9.6  Creatinine 1.3  Educated on the importance of OOB/ambulation as much as possible  Clear for discharge this afternoon - follow up with Dr. Corea 7/5/24      Subjective:   Pj is POD #1 s/p radical robotic right nephrectomy by Dr. Corea.  SONNY drain was removed by urology team yesterday afternoon.  Patient remained inpatient overnight as he was unable to void following Rausch catheter removal and wanted to stay for ongoing pain medication.  Patient has since voided multiple times.  Urine is yellow and clear.  Patient feels much better today overall.  He has been ambulating around the halls.  Pain remains in the right shoulder likely due to how he slept last night and gas inserted during surgery.  Patient denies chest pain, shortness of breath, pain/swelling in the lower extremities.  No dysuria, hematuria, or pain within the bladder or bilateral flanks.  Surgical scars are well-approximated, clean and dry, no signs of infection.  Patient is cleared for discharge this afternoon.    Review of Systems   Constitutional:  Negative for activity change, chills, fatigue and fever.   Respiratory:  Negative for apnea, cough and shortness of breath.    Gastrointestinal:  Positive for abdominal pain (mild, generalized) and constipation. Negative for abdominal distention, diarrhea, rectal pain and vomiting.   Genitourinary:  Negative for decreased urine volume,  "difficulty urinating, dysuria, flank pain, frequency, hematuria, penile pain, testicular pain and urgency.   Musculoskeletal:         Right shoulder pain   Neurological:  Negative for dizziness and headaches.   Psychiatric/Behavioral: Negative.         Objective:  Vitals: Blood pressure 114/59, pulse 67, temperature 99.3 °F (37.4 °C), resp. rate 18, height 5' 6\" (1.676 m), weight 102 kg (225 lb 5 oz), SpO2 94%.,Body mass index is 36.37 kg/m².    Intake/Output Summary (Last 24 hours) at 6/19/2024 1102  Last data filed at 6/19/2024 0646  Gross per 24 hour   Intake 180 ml   Output 480 ml   Net -300 ml     Invasive Devices       Peripheral Intravenous Line  Duration             Peripheral IV 06/17/24 Dorsal (posterior);Left Hand 2 days              Drain  Duration             Closed/Suction Drain Right Abdomen Bulb 10 Fr. 1 day                    Physical Exam  Vitals and nursing note reviewed.   Constitutional:       Appearance: Normal appearance. He is obese.   HENT:      Head: Normocephalic and atraumatic.      Mouth/Throat:      Pharynx: Oropharynx is clear.   Eyes:      Extraocular Movements: Extraocular movements intact.      Conjunctiva/sclera: Conjunctivae normal.   Cardiovascular:      Rate and Rhythm: Normal rate.   Pulmonary:      Effort: Pulmonary effort is normal.   Abdominal:      General: Abdomen is flat. There is no distension.      Palpations: Abdomen is soft.      Tenderness: There is no abdominal tenderness (none to palaption). There is no right CVA tenderness or left CVA tenderness.      Comments: SONNY drain removed. Surgical scars are clean and dry, no erythema or discharge.    Genitourinary:     Penis: Normal.       Comments: Rausch removed  Musculoskeletal:         General: Normal range of motion.      Cervical back: Normal range of motion.   Skin:     General: Skin is warm and dry.   Neurological:      General: No focal deficit present.      Mental Status: He is alert and oriented to person, place, " and time.   Psychiatric:         Mood and Affect: Mood normal.         Behavior: Behavior normal.         Labs:  Recent Labs     06/17/24  1720 06/18/24  0507   WBC 15.25* 9.60     Recent Labs     06/17/24  1720 06/18/24  0507   HGB 13.0 12.1       Recent Labs     06/17/24  1720 06/18/24  0507 06/19/24  0619   CREATININE 1.09 1.18 1.30       History:    Past Medical History:   Diagnosis Date    Diverticulosis     GERD (gastroesophageal reflux disease)     Renal mass, right     rt nephrectomy today 6/17/2024     Past Surgical History:   Procedure Laterality Date    NO PAST SURGERIES      [place] LAPAROSCOPY RADICAL NEPHRECTOMY Right 6/17/2024    Procedure: NEPHRECTOMY RADICAL  W/ ROBOTICS;  Surgeon: Chapincito Corea MD;  Location: [facility];  Service: Urology     Family History   Problem Relation Age of Onset    Cancer Mother         vaginal    No Known Problems Father      Social History     Socioeconomic History    Marital status: /Civil Union     Spouse name: None    Number of children: None    Years of education: None    Highest education level: None   Occupational History    None   Tobacco Use    Smoking status: Former     Types: Cigarettes    Smokeless tobacco: Never    Tobacco comments:     Was a social smoker, quit in 2019   Vaping Use    Vaping status: Never Used   Substance and Sexual Activity    Alcohol use: Yes     Comment: occasionally, on weekends 1 x    Drug use: Not Currently    Sexual activity: None   Other Topics Concern    None   Social History Narrative    None     Social Determinants of Health     Financial Resource Strain: Not on file   Food Insecurity: No Food Insecurity (5/17/2024)    Hunger Vital Sign     Worried About Running Out of Food in the Last Year: Never true     Ran Out of Food in the Last Year: Never true   Transportation Needs: No Transportation Needs (5/17/2024)    PRAPARE - Transportation     Lack of Transportation (Medical): No     Lack of Transportation (Non-Medical): No    Physical Activity: Not on file   Stress: Not on file   Social Connections: Not on file   Intimate Partner Violence: Not on file   Housing Stability: Low Risk  (5/17/2024)    Housing Stability Vital Sign     Unable to Pay for Housing in the Last Year: No     Number of Times Moved in the Last Year: 0     Homeless in the Last Year: No         Ericka Covington PA-C  Date: 6/19/2024 Time: 11:02 AM

## 2024-06-19 NOTE — DISCHARGE SUMMARY
Discharge Summary - Pj Vences 49 y.o. male MRN: [id number]    Unit/Bed#: E5 -01 Encounter: [id number]    Admission Date: 6/17/2024     Discharge Date: 06/19/24    HPI: Pj Vences is a 49 y.o. male who presented for right radical nephrectomy by Dr. Corea and Dr. Diaz.      Procedure(s):  NEPHRECTOMY RADICAL  W/ ROBOTICS  Surgeon(s):  MD Jd Miller PA-C Frank Jeremy Tamarkin, MD  6/17/2024    Hospital Course: Pj is POD #1 s/p radical radical robotic right nephrectomy completed by Dr. Aleman.  No intraoperative complications reported.  Patient was transported to PACU in stable condition.  Postop day 1, Rausch catheter was removed.  Patient was having a difficult time urinating independently.  SONNY drain was also removed 6/19.  Patient opted to remain overnight for observation, more pain medication, and to ensure he could urinate appropriately.  Today, patient is urinating normally.  No signs of urinary retention, urine is clear and yellow.  Abdominal pain has improved significantly with increased ambulation.  He reports mild right shoulder pain likely due to gas inserted during surgery as well as that he slept overnight.  Patient denies dysuria, hematuria, chest pain, shortness of breath, or pain within the bladder/bilateral flanks.  Abdomen is soft, nondistended.  Patient is tolerating a normal diet, denies nausea/vomiting.  WBC is stable at 9.6.  Creatinine remained stable at 1.3.  Surgical sites are clean and dry, no signs of infection.  Patient educated on the importance of continued ambulation and utilizing Colace to improve bowel function.  Patient is cleared for discharge this afternoon.  He has follow-up set up with Dr. Corea on 7/5/2024.    Discharge Diagnosis: Right renal mass    Condition at Discharge: good    Discharge Medications:  See after visit summary for reconciled discharge medications provided to patient and family.  Patient was discharged home on home  medications with the addition of Colace for constipation, Tylenol/Norco for pain.     Discharge instructions/Information to patient and family:   See after visit summary for written and verbal information which has been provided to patient and family.      Provisions for Follow-Up Care:  See after visit summary for information related to follow-up care and any pertinent home health orders.      Disposition: Home    Planned Readmission: No    Discharge Statement   I spent 15 minutes discharging the patient. This time was spent on the day of discharge. I had direct contact with the patient on the day of discharge. Additional documentation is required if more than 30 minutes were spent on discharge.     Signature:   Ericka Covington PA-C  Date: 6/19/2024 Time: 11:05 AM

## 2024-06-19 NOTE — NURSING NOTE
Discharge instructions reviewed with pt and family. Pt is aware of new prescriptions to be picked up. Pt has no questions or concerns, family is providing ride home.

## 2024-06-19 NOTE — PLAN OF CARE
Problem: PAIN - ADULT  Goal: Verbalizes/displays adequate comfort level or baseline comfort level  Description: Interventions:  - Encourage patient to monitor pain and request assistance  - Assess pain using appropriate pain scale  - Administer analgesics based on type and severity of pain and evaluate response  - Implement non-pharmacological measures as appropriate and evaluate response  - Consider cultural and social influences on pain and pain management  - Notify physician/advanced practitioner if interventions unsuccessful or patient reports new pain  Outcome: Progressing     Problem: INFECTION - ADULT  Goal: Absence or prevention of progression during hospitalization  Description: INTERVENTIONS:  - Assess and monitor for signs and symptoms of infection  - Monitor lab/diagnostic results  - Monitor all insertion sites, i.e. indwelling lines, tubes, and drains  - Monitor endotracheal if appropriate and nasal secretions for changes in amount and color  - [place] appropriate cooling/warming therapies per order  - Administer medications as ordered  - Instruct and encourage patient and family to use good hand hygiene technique  - Identify and instruct in appropriate isolation precautions for identified infection/condition  Outcome: Progressing     Problem: SAFETY ADULT  Goal: Patient will remain free of falls  Description: INTERVENTIONS:  - Educate patient/family on patient safety including physical limitations  - Instruct patient to call for assistance with activity   - Consult OT/PT to assist with strengthening/mobility   - Keep Call bell within reach  - Keep bed low and locked with side rails adjusted as appropriate  - Keep care items and personal belongings within reach  - Initiate and maintain comfort rounds  - Make Fall Risk Sign visible to staff  - Offer Toileting every 2 Hours, in advance of need  - Initiate/Maintain bed alarm  - Apply yellow socks and bracelet for high fall risk patients  - Consider  moving patient to room near nurses station  Outcome: Progressing  Goal: Maintain or return to baseline ADL function  Description: INTERVENTIONS:  -  Assess patient's ability to carry out ADLs; assess patient's baseline for ADL function and identify physical deficits which impact ability to perform ADLs (bathing, care of mouth/teeth, toileting, grooming, dressing, etc.)  - Assess/evaluate cause of self-care deficits   - Assess range of motion  - Assess patient's mobility; develop plan if impaired  - Assess patient's need for assistive devices and provide as appropriate  - Encourage maximum independence but intervene and supervise when necessary  - Involve family in performance of ADLs  - Assess for home care needs following discharge   - Consider OT consult to assist with ADL evaluation and planning for discharge  - Provide patient education as appropriate  Outcome: Progressing  Goal: Maintains/Returns to pre admission functional level  Description: INTERVENTIONS:  - Perform AM-PAC 6 Click Basic Mobility/ Daily Activity assessment daily.  - Set and communicate daily mobility goal to care team and patient/family/caregiver.   - Collaborate with rehabilitation services on mobility goals if consulted  - Ambulate patient 3 times a day  - Out of bed to chair 3 times a day   - Out of bed for meals 3 times a day  - Out of bed for toileting  - Record patient progress and toleration of activity level   Outcome: Progressing     Problem: DISCHARGE PLANNING  Goal: Discharge to home or other facility with appropriate resources  Description: INTERVENTIONS:  - Identify barriers to discharge w/patient and caregiver  - Arrange for needed discharge resources and transportation as appropriate  - Identify discharge learning needs (meds, wound care, etc.)  - Arrange for interpretive services to assist at discharge as needed  - Refer to Case Management Department for coordinating discharge planning if the patient needs post-hospital  services based on physician/advanced practitioner order or complex needs related to functional status, cognitive ability, or social support system  Outcome: Progressing     Problem: Knowledge Deficit  Goal: Patient/family/caregiver demonstrates understanding of disease process, treatment plan, medications, and discharge instructions  Description: Complete learning assessment and assess knowledge base.  Interventions:  - Provide teaching at level of understanding  - Provide teaching via preferred learning methods  Outcome: Progressing     Problem: GENITOURINARY - ADULT  Goal: Maintains or returns to baseline urinary function  Description: INTERVENTIONS:  - Assess urinary function  - Encourage oral fluids to ensure adequate hydration if ordered  - Administer IV fluids as ordered to ensure adequate hydration  - Administer ordered medications as needed  - Offer frequent toileting  - Follow urinary retention protocol if ordered  Outcome: Progressing  Goal: Absence of urinary retention  Description: INTERVENTIONS:  - Assess patient’s ability to void and empty bladder  - Monitor I/O  - Bladder scan as needed  - Discuss with physician/AP medications to alleviate retention as needed  - Discuss catheterization for long term situations as appropriate  Outcome: Progressing  Goal: Urinary catheter remains patent  Description: INTERVENTIONS:  - Assess patency of urinary catheter  - If patient has a chronic luis, consider changing catheter if non-functioning  - Follow guidelines for intermittent irrigation of non-functioning urinary catheter  Outcome: Progressing     Problem: SKIN/TISSUE INTEGRITY - ADULT  Goal: Skin Integrity remains intact(Skin Breakdown Prevention)  Description: Assess:  -Perform Silas assessment every shift  -Clean and moisturize skin every shift  -Inspect skin when repositioning, toileting, and assisting with ADLS  -Assess under medical devices such as fernando sleeve every shift  -Assess extremities for  adequate circulation and sensation     Bed Management:  -Have minimal linens on bed & keep smooth, unwrinkled  -Change linens as needed when moist or perspiring  -Avoid sitting or lying in one position for more than 2 hours while in bed  -Keep HOB at 30degrees     Toileting:  -Offer bedside commode    Activity:  -Mobilize patient 4 times a day  -Encourage activity and walks on unit  -Instruct/ Assist with weight shifting every 2 hours when out of bed in chair  -Consider limitation of chair time 2 hour intervals    Skin Care:  -Avoid use of baby powder, tape, friction and shearing, hot water or constrictive clothing    Next Steps:  -Teach patient strategies to minimize risks such as incentive spirometer   -Consider consults to  interdisciplinary teams such as n/a  Outcome: Progressing  Goal: Incision(s), wounds(s) or drain site(s) healing without S/S of infection  Description: INTERVENTIONS  - Assess and document dressing, incision, wound bed, drain sites and surrounding tissue  - Provide patient and family education  - Perform skin care/dressing changes as ordered  Outcome: Progressing

## 2024-06-19 NOTE — TELEPHONE ENCOUNTER
Post Op Note - Called pt thru [employer] [facility] - [place] #[phone] - LVM for pt to call the office to let us know how he is doing since surgery - pt was told to ask for Shania MCGEE when he calls back.    Pj Vences is a 49 y.o. male s/p NEPHRECTOMY RADICAL W/ ROBOTICS (Right: Abdomen)  performed 6/17/24.  Pj Vences is a patient of Dr. Corea and is seen at the [place] office.     How would you rate your pain on a scale from 1 to 10, 10 being the worst pain ever?   Have you had a fever?   Have your bowel movements been regular?   Do you have any difficulty urinating?   If the patient has an incision- do you have any redness around the incision or any drainage from the incision?     Do you have any other questions or concerns that I can address at this time? Post op call placed to patient. Check to see if pt is taking Tylenol, Colace, and Hydrocodone.    *Pt has follow up appointment with Dr. Corea on 7/5/24 for pathology results.

## 2024-06-19 NOTE — TELEPHONE ENCOUNTER
2nd attempt to contact pt thru [employer] [facility]: Marta #[id number].  SHANNA to contact the office - office number provided.    Also a call was made to pt's son, Anton - [phone] to see how his father is doing. Anton asked to call back at 4 pm when he is back home with his father as he is out getting his meds at the pharmacy. Will try again shortly.

## 2024-06-19 NOTE — PLAN OF CARE
Problem: PAIN - ADULT  Goal: Verbalizes/displays adequate comfort level or baseline comfort level  Description: Interventions:  - Encourage patient to monitor pain and request assistance  - Assess pain using appropriate pain scale  - Administer analgesics based on type and severity of pain and evaluate response  - Implement non-pharmacological measures as appropriate and evaluate response  - Consider cultural and social influences on pain and pain management  - Notify physician/advanced practitioner if interventions unsuccessful or patient reports new pain  Outcome: Progressing     Problem: INFECTION - ADULT  Goal: Absence or prevention of progression during hospitalization  Description: INTERVENTIONS:  - Assess and monitor for signs and symptoms of infection  - Monitor lab/diagnostic results  - Monitor all insertion sites, i.e. indwelling lines, tubes, and drains  - Monitor endotracheal if appropriate and nasal secretions for changes in amount and color  - [place] appropriate cooling/warming therapies per order  - Administer medications as ordered  - Instruct and encourage patient and family to use good hand hygiene technique  - Identify and instruct in appropriate isolation precautions for identified infection/condition  Outcome: Progressing     Problem: SAFETY ADULT  Goal: Patient will remain free of falls  Description: INTERVENTIONS:  - Educate patient/family on patient safety including physical limitations  - Instruct patient to call for assistance with activity   - Consult OT/PT to assist with strengthening/mobility   - Keep Call bell within reach  - Keep bed low and locked with side rails adjusted as appropriate  - Keep care items and personal belongings within reach  - Initiate and maintain comfort rounds  - Make Fall Risk Sign visible to staff  - Offer Toileting every 2 Hours, in advance of need  - Initiate/Maintain bed alarm  - Apply yellow socks and bracelet for high fall risk patients  - Consider  moving patient to room near nurses station  Outcome: Progressing  Goal: Maintain or return to baseline ADL function  Description: INTERVENTIONS:  -  Assess patient's ability to carry out ADLs; assess patient's baseline for ADL function and identify physical deficits which impact ability to perform ADLs (bathing, care of mouth/teeth, toileting, grooming, dressing, etc.)  - Assess/evaluate cause of self-care deficits   - Assess range of motion  - Assess patient's mobility; develop plan if impaired  - Assess patient's need for assistive devices and provide as appropriate  - Encourage maximum independence but intervene and supervise when necessary  - Involve family in performance of ADLs  - Assess for home care needs following discharge   - Consider OT consult to assist with ADL evaluation and planning for discharge  - Provide patient education as appropriate  Outcome: Progressing  Goal: Maintains/Returns to pre admission functional level  Description: INTERVENTIONS:  - Perform AM-PAC 6 Click Basic Mobility/ Daily Activity assessment daily.  - Set and communicate daily mobility goal to care team and patient/family/caregiver.   - Collaborate with rehabilitation services on mobility goals if consulted  - Ambulate patient 3 times a day  - Out of bed to chair 3 times a day   - Out of bed for meals 3 times a day  - Out of bed for toileting  - Record patient progress and toleration of activity level   Outcome: Progressing     Problem: DISCHARGE PLANNING  Goal: Discharge to home or other facility with appropriate resources  Description: INTERVENTIONS:  - Identify barriers to discharge w/patient and caregiver  - Arrange for needed discharge resources and transportation as appropriate  - Identify discharge learning needs (meds, wound care, etc.)  - Arrange for interpretive services to assist at discharge as needed  - Refer to Case Management Department for coordinating discharge planning if the patient needs post-hospital  services based on physician/advanced practitioner order or complex needs related to functional status, cognitive ability, or social support system  Outcome: Progressing     Problem: Knowledge Deficit  Goal: Patient/family/caregiver demonstrates understanding of disease process, treatment plan, medications, and discharge instructions  Description: Complete learning assessment and assess knowledge base.  Interventions:  - Provide teaching at level of understanding  - Provide teaching via preferred learning methods  Outcome: Progressing     Problem: GENITOURINARY - ADULT  Goal: Maintains or returns to baseline urinary function  Description: INTERVENTIONS:  - Assess urinary function  - Encourage oral fluids to ensure adequate hydration if ordered  - Administer IV fluids as ordered to ensure adequate hydration  - Administer ordered medications as needed  - Offer frequent toileting  - Follow urinary retention protocol if ordered  Outcome: Progressing  Goal: Absence of urinary retention  Description: INTERVENTIONS:  - Assess patient’s ability to void and empty bladder  - Monitor I/O  - Bladder scan as needed  - Discuss with physician/AP medications to alleviate retention as needed  - Discuss catheterization for long term situations as appropriate  Outcome: Progressing  Goal: Urinary catheter remains patent  Description: INTERVENTIONS:  - Assess patency of urinary catheter  - If patient has a chronic luis, consider changing catheter if non-functioning  - Follow guidelines for intermittent irrigation of non-functioning urinary catheter  Outcome: Progressing     Problem: SKIN/TISSUE INTEGRITY - ADULT  Goal: Skin Integrity remains intact(Skin Breakdown Prevention)  Description: Assess:  -Perform Silas assessment every shift  -Clean and moisturize skin every shift  -Inspect skin when repositioning, toileting, and assisting with ADLS  -Assess under medical devices such as fernando sleeve every shift  -Assess extremities for  adequate circulation and sensation     Bed Management:  -Have minimal linens on bed & keep smooth, unwrinkled  -Change linens as needed when moist or perspiring  -Avoid sitting or lying in one position for more than 2 hours while in bed  -Keep HOB at 30degrees     Toileting:  -Offer bedside commode    Activity:  -Mobilize patient 4 times a day  -Encourage activity and walks on unit  -Instruct/ Assist with weight shifting every 2 hours when out of bed in chair  -Consider limitation of chair time 2 hour intervals    Skin Care:  -Avoid use of baby powder, tape, friction and shearing, hot water or constrictive clothing    Next Steps:  -Teach patient strategies to minimize risks such as incentive spirometer   -Consider consults to  interdisciplinary teams such as n/a  Outcome: Progressing  Goal: Incision(s), wounds(s) or drain site(s) healing without S/S of infection  Description: INTERVENTIONS  - Assess and document dressing, incision, wound bed, drain sites and surrounding tissue  - Provide patient and family education  - Perform skin care/dressing changes every shift  Outcome: Progressing

## 2024-06-19 NOTE — TELEPHONE ENCOUNTER
Post Op Note - called to pt with his son, Alec blunt on speaker phone. Pt is able to understand some English.      Pj Vences is a 49 y.o. male s/p NEPHRECTOMY RADICAL W/ ROBOTICS (Right: Abdomen)  performed 6/17/24.  Pj Vences is a patient of Dr. Corea and is seen at the [place] office.      How would you rate your pain on a scale from 1 to 10, 10 being the worst pain ever? 8  Have you had a fever? No  Have your bowel movements been regular? No but his son has just picked up Colace and he will begin taking it today  Do you have any difficulty urinating? No  If the patient has an incision- do you have any redness around the incision or any drainage from the incision? No     Do you have any other questions or concerns that I can address at this time? Post op call placed to patient, with his son present. Patient says he is doing okay. Pt's son has just picked up his medications -Tylenol and Hydrocodone for pain and Colace for his bowels. He will begin taking the medication today. He understands to call Shania MCGEE if pain continues or worsens. He relayed his understanding.      *Pt has follow up appointment with Dr. Corea on 7/5/24 for pathology results.

## 2024-06-20 LAB
ABO GROUP BLD BPU: NORMAL
ABO GROUP BLD BPU: NORMAL
BPU ID: NORMAL
BPU ID: NORMAL
CROSSMATCH: NORMAL
CROSSMATCH: NORMAL
UNIT DISPENSE STATUS: NORMAL
UNIT DISPENSE STATUS: NORMAL
UNIT PRODUCT CODE: NORMAL
UNIT PRODUCT CODE: NORMAL
UNIT PRODUCT VOLUME: 350 ML
UNIT PRODUCT VOLUME: 350 ML
UNIT RH: NORMAL
UNIT RH: NORMAL

## 2024-07-02 ENCOUNTER — ANESTHESIA (OUTPATIENT)
Dept: ANESTHESIOLOGY | Facility: HOSPITAL | Age: 49
End: 2024-07-02

## 2024-07-02 ENCOUNTER — ANESTHESIA EVENT (OUTPATIENT)
Dept: ANESTHESIOLOGY | Facility: HOSPITAL | Age: 49
End: 2024-07-02

## 2024-07-05 ENCOUNTER — OFFICE VISIT (OUTPATIENT)
Dept: UROLOGY | Facility: MEDICAL CENTER | Age: 49
End: 2024-07-05

## 2024-07-05 VITALS — BODY MASS INDEX: 35.52 KG/M2 | WEIGHT: 221 LBS | HEIGHT: 66 IN

## 2024-07-05 DIAGNOSIS — C64.1 RENAL CELL CARCINOMA OF RIGHT KIDNEY (HCC): ICD-10-CM

## 2024-07-05 DIAGNOSIS — N28.89 RIGHT RENAL MASS: Primary | ICD-10-CM

## 2024-07-05 LAB — POST-VOID RESIDUAL VOLUME, ML POC: 32 ML

## 2024-07-05 PROCEDURE — 51798 US URINE CAPACITY MEASURE: CPT | Performed by: UROLOGY

## 2024-07-05 PROCEDURE — 99024 POSTOP FOLLOW-UP VISIT: CPT | Performed by: UROLOGY

## 2024-07-05 NOTE — PROGRESS NOTES
7/5/2024    Pj Ru  1975  [id number]    1. Right renal mass  -     POCT Measure PVR  2. Renal cell carcinoma of right kidney (HCC)  Assessment & Plan:  Right RCC s/p robotic right radical nephrectomy om 6/17/2024  pT1b clear cell RCC, G1, negative margins  Low Risk category per AUA guidelines  - BMP   - plan for annual imaging with CTAP and CXR    Orders:  -     Basic metabolic panel; Future       History of Present Illness  49 y.o. male with a history of right renal mass s/p robotic right nephrectomy on 6/17/2024    Doing well postoperatively  Reports fatigue post surgery  Voiding normally  Tolerating diet, normal bowl function    Final pathology:    pT1b clear cell RCC, G1, negative margins      AUA Symptom Score  AUA SYMPTOM SCORE      Flowsheet Row Most Recent Value   AUA SYMPTOM SCORE    How often have you had a sensation of not emptying your bladder completely after you finished urinating? 1   How often have you had to urinate again less than two hours after you finished urinating? 3   How often have you found you stopped and started again several times when you urinate? 0   How often have you found it difficult to postpone urination? 0   How often have you had a weak urinary stream? 0   How often have you had to push or strain to begin urination? 0   How many times did you most typically get up to urinate from the time you went to bed at night until the time you got up in the morning? 5   Quality of Life: If you were to spend the rest of your life with your urinary condition just the way it is now, how would you feel about that? 3   AUA SYMPTOM SCORE 9            Review of Systems   All other systems reviewed and are negative.      Past Medical History  Past Medical History:   Diagnosis Date    Diverticulosis     GERD (gastroesophageal reflux disease)     Renal mass, right     rt nephrectomy today 6/17/2024       Past Social History  Past Surgical History:   Procedure Laterality Date    NO PAST  SURGERIES      OR LAPAROSCOPY RADICAL NEPHRECTOMY Right 6/17/2024    Procedure: NEPHRECTOMY RADICAL  W/ ROBOTICS;  Surgeon: Chapincito Corea MD;  Location: [facility];  Service: Urology       Past Family History  Family History   Problem Relation Age of Onset    Cancer Mother         vaginal    No Known Problems Father        Past Social history  Social History     Socioeconomic History    Marital status: /Civil Union     Spouse name: Not on file    Number of children: Not on file    Years of education: Not on file    Highest education level: Not on file   Occupational History    Not on file   Tobacco Use    Smoking status: Former     Types: Cigarettes    Smokeless tobacco: Never    Tobacco comments:     Was a social smoker, quit in 2019   Vaping Use    Vaping status: Never Used   Substance and Sexual Activity    Alcohol use: Yes     Comment: occasionally, on weekends 1 x    Drug use: Not Currently    Sexual activity: Not on file   Other Topics Concern    Not on file   Social History Narrative    Not on file     Social Determinants of Health     Financial Resource Strain: Not on file   Food Insecurity: No Food Insecurity (5/17/2024)    Hunger Vital Sign     Worried About Running Out of Food in the Last Year: Never true     Ran Out of Food in the Last Year: Never true   Transportation Needs: No Transportation Needs (5/17/2024)    PRAPARE - Transportation     Lack of Transportation (Medical): No     Lack of Transportation (Non-Medical): No   Physical Activity: Not on file   Stress: Not on file   Social Connections: Not on file   Intimate Partner Violence: Not on file   Housing Stability: Low Risk  (5/17/2024)    Housing Stability Vital Sign     Unable to Pay for Housing in the Last Year: No     Number of Times Moved in the Last Year: 0     Homeless in the Last Year: No       Current Medications  Current Outpatient Medications   Medication Sig Dispense Refill    acetaminophen (TYLENOL) 325 mg tablet Take 2  "tablets (650 mg total) by mouth every 6 (six) hours 50 tablet 0    docusate sodium (COLACE) 100 mg capsule Take 1 capsule (100 mg total) by mouth 2 (two) times a day 28 capsule 0     No current facility-administered medications for this visit.       Allergies  No Known Allergies    Past Medical History, Social History, Family History, medications and allergies were reviewed.    Vitals  Vitals:    07/05/24 0852   Weight: 100 kg (221 lb)   Height: 5' 6\" (1.676 m)       Physical Exam  Constitutional:       Appearance: Normal appearance. He is normal weight.   HENT:      Head: Normocephalic.      Nose: Nose normal. No congestion.   Eyes:      Conjunctiva/sclera: Conjunctivae normal.   Cardiovascular:      Rate and Rhythm: Normal rate.   Pulmonary:      Effort: Pulmonary effort is normal. No respiratory distress.   Abdominal:      General: Abdomen is flat. There is no distension.      Palpations: Abdomen is soft.      Tenderness: There is no right CVA tenderness or left CVA tenderness.      Comments: Well healing abdominal incisions   Musculoskeletal:      Comments: Normal gait   Skin:     General: Skin is warm and dry.   Neurological:      General: No focal deficit present.      Mental Status: He is alert and oriented to person, place, and time.   Psychiatric:         Mood and Affect: Mood normal.         Results  No results found for: \"PSA\"  Lab Results   Component Value Date    CALCIUM 8.8 06/19/2024    K 3.8 06/19/2024    CO2 29 06/19/2024     06/19/2024    BUN 14 06/19/2024    CREATININE 1.30 06/19/2024     Lab Results   Component Value Date    WBC 9.60 06/18/2024    HGB 12.1 06/18/2024    HCT 35.9 (L) 06/18/2024    MCV 83 06/18/2024     06/18/2024       Office Urine Dip  Recent Results (from the past 1 hour(s))   POCT Measure PVR    Collection Time: 07/05/24  8:58 AM   Result Value Ref Range    POST-VOID RESIDUAL VOLUME, ML POC 32 mL   ]    "

## 2024-07-05 NOTE — ASSESSMENT & PLAN NOTE
Right RCC s/p robotic right radical nephrectomy om 6/17/2024  pT1b clear cell RCC, G1, negative margins  Low Risk category per AUA guidelines  - BMP   - plan for annual imaging with CTAP and CXR

## 2024-07-16 ENCOUNTER — HOSPITAL ENCOUNTER (OUTPATIENT)
Dept: GASTROENTEROLOGY | Facility: MEDICAL CENTER | Age: 49
Setting detail: OUTPATIENT SURGERY
Discharge: HOME | End: 2024-07-16

## 2024-07-16 ENCOUNTER — ANESTHESIA EVENT (OUTPATIENT)
Dept: GASTROENTEROLOGY | Facility: MEDICAL CENTER | Age: 49
End: 2024-07-16

## 2024-07-16 ENCOUNTER — ANESTHESIA (OUTPATIENT)
Dept: GASTROENTEROLOGY | Facility: MEDICAL CENTER | Age: 49
End: 2024-07-16

## 2024-07-16 VITALS
RESPIRATION RATE: 18 BRPM | TEMPERATURE: 96.5 F | WEIGHT: 223 LBS | SYSTOLIC BLOOD PRESSURE: 116 MMHG | HEART RATE: 48 BPM | DIASTOLIC BLOOD PRESSURE: 57 MMHG | BODY MASS INDEX: 35.84 KG/M2 | HEIGHT: 66 IN | OXYGEN SATURATION: 99 %

## 2024-07-16 DIAGNOSIS — K57.92 DIVERTICULITIS: ICD-10-CM

## 2024-07-16 PROCEDURE — 88305 TISSUE EXAM BY PATHOLOGIST: CPT | Performed by: PATHOLOGY

## 2024-07-16 PROCEDURE — 45380 COLONOSCOPY AND BIOPSY: CPT | Performed by: INTERNAL MEDICINE

## 2024-07-16 PROCEDURE — 45385 COLONOSCOPY W/LESION REMOVAL: CPT | Performed by: INTERNAL MEDICINE

## 2024-07-16 RX ORDER — PROPOFOL 10 MG/ML
INJECTION, EMULSION INTRAVENOUS AS NEEDED
Status: DISCONTINUED | OUTPATIENT
Start: 2024-07-16 | End: 2024-07-16

## 2024-07-16 RX ORDER — SODIUM CHLORIDE 9 MG/ML
125 INJECTION, SOLUTION INTRAVENOUS CONTINUOUS
Status: DISCONTINUED | OUTPATIENT
Start: 2024-07-16 | End: 2024-07-20 | Stop reason: HOSPADM

## 2024-07-16 RX ADMIN — PROPOFOL 30 MG: 10 INJECTION, EMULSION INTRAVENOUS at 11:10

## 2024-07-16 RX ADMIN — PROPOFOL 100 MG: 10 INJECTION, EMULSION INTRAVENOUS at 11:06

## 2024-07-16 RX ADMIN — SODIUM CHLORIDE 125 ML/HR: 0.9 INJECTION, SOLUTION INTRAVENOUS at 10:56

## 2024-07-16 RX ADMIN — PROPOFOL 30 MG: 10 INJECTION, EMULSION INTRAVENOUS at 11:14

## 2024-07-16 RX ADMIN — PROPOFOL 30 MG: 10 INJECTION, EMULSION INTRAVENOUS at 11:08

## 2024-07-16 NOTE — ANESTHESIA POSTPROCEDURE EVALUATION
Post-Op Assessment Note    CV Status:  Stable  Pain Score: 0    Pain management: adequate       Mental Status:  Alert and awake   Hydration Status:  Euvolemic   PONV Controlled:  Controlled   Airway Patency:  Patent  Two or more mitigation strategies used for obstructive sleep apnea   Post Op Vitals Reviewed: Yes    No anethesia notable event occurred.    Staff: Anesthesiologist, CRNA               BP (!) 83/45 (07/16/24 1122)    Temp      Pulse (!) 53 (07/16/24 1122)   Resp 16 (07/16/24 1122)    SpO2 99 % (07/16/24 1122)

## 2024-07-16 NOTE — H&P
"History and Physical - [facility]  Pj Vences 49 y.o. male MRN: [id number]                  HPI: Pj Vences is a 49 y.o. year old male who presents for colonoscopy due to history of diverticulitis.      REVIEW OF SYSTEMS: Per the HPI, and otherwise unremarkable.    Historical Information   Past Medical History:   Diagnosis Date    Cancer (HCC)     Kidney Right removed    Diverticulosis     GERD (gastroesophageal reflux disease)     Renal mass, right     rt nephrectomy today 6/17/2024     Past Surgical History:   Procedure Laterality Date    NO PAST SURGERIES      [place] LAPAROSCOPY RADICAL NEPHRECTOMY Right 6/17/2024    Procedure: NEPHRECTOMY RADICAL  W/ ROBOTICS;  Surgeon: Chapincito Corea MD;  Location: [facility];  Service: Urology     Social History   Social History     Substance and Sexual Activity   Alcohol Use Yes    Comment: occasionally, on weekends 1 x     Social History     Substance and Sexual Activity   Drug Use Not Currently     Social History     Tobacco Use   Smoking Status Former    Types: Cigarettes   Smokeless Tobacco Never   Tobacco Comments    Was a social smoker, quit in 2019     Family History   Problem Relation Age of Onset    Cancer Mother         vaginal    No Known Problems Father        Meds/Allergies     Not in a hospital admission.    No Known Allergies    Objective     Blood pressure 143/72, pulse (!) 49, temperature (!) 96.5 °F (35.8 °C), temperature source Temporal, resp. rate 18, height 5' 6\" (1.676 m), weight 101 kg (223 lb), SpO2 99%.      PHYSICAL EXAM    Gen: NAD  CV: RRR  CHEST: Clear  ABD: soft, NT/ND  EXT: no edema      ASSESSMENT/PLAN:  This is a 49 y.o. year old male here for colonoscopy due to history of diverticulitis, and he is stable and optimized for his procedure.The patient is stable and optimized for the procedure, we reviewed risk and benefits. Risk include but not limited to infection, bleeding, perforation and missing a lesion.          "

## 2024-07-16 NOTE — ANESTHESIA PREPROCEDURE EVALUATION
Procedure:  COLONOSCOPY     - s/p radical right nephrectomy (06/18/24)    Relevant Problems   ANESTHESIA (within normal limits)      CARDIO (within normal limits)      ENDO (within normal limits)      GI/HEPATIC (within normal limits)      /RENAL   (+) Renal cell carcinoma of right kidney (HCC)      GYN (within normal limits)      HEMATOLOGY (within normal limits)      MUSCULOSKELETAL (within normal limits)      NEURO/PSYCH (within normal limits)      PULMONARY (within normal limits)      FEN/Gastrointestinal   (+) Acute diverticulitis      Lab Results   Component Value Date    WBC 9.60 06/18/2024    HGB 12.1 06/18/2024    HCT 35.9 (L) 06/18/2024    MCV 83 06/18/2024     06/18/2024     Lab Results   Component Value Date    SODIUM 135 06/19/2024    K 3.8 06/19/2024     06/19/2024    CO2 29 06/19/2024    AGAP 5 06/19/2024    BUN 14 06/19/2024    CREATININE 1.30 06/19/2024    GLUC 109 06/19/2024    CALCIUM 8.8 06/19/2024    AST 18 05/16/2024    ALT 31 05/16/2024    ALKPHOS 67 05/16/2024    TP 6.7 05/16/2024    TBILI 0.41 05/16/2024    EGFR 63 06/19/2024     Lab Results   Component Value Date    PTT 32 06/09/2024       Lab Results   Component Value Date    INR 0.96 06/09/2024    PROTIME 12.7 06/09/2024       Physical Exam    Airway    Mallampati score: II  TM Distance: >3 FB  Neck ROM: full     Dental       Cardiovascular  Rhythm: regular, Rate: normal, Cardiovascular exam normal    Pulmonary  Pulmonary exam normal Breath sounds clear to auscultation    Other Findings        Anesthesia Plan  ASA Score- 3     Anesthesia Type- IV sedation with anesthesia with ASA Monitors.         Additional Monitors:     Airway Plan:            Plan Factors-Exercise tolerance (METS): >4 METS.    Chart reviewed. EKG reviewed. Imaging results reviewed. Existing labs reviewed. Patient summary reviewed.    Patient is not a current smoker.  Patient did not smoke on day of surgery.    Obstructive sleep apnea risk education given  perioperatively.        Induction- intravenous.    Postoperative Plan-         Informed Consent- Anesthetic plan and risks discussed with patient.  I personally reviewed this patient with the CRNA. Discussed and agreed on the Anesthesia Plan with the CRNA..

## 2024-07-19 PROCEDURE — 88305 TISSUE EXAM BY PATHOLOGIST: CPT | Performed by: PATHOLOGY

## 2024-07-20 NOTE — RESULT ENCOUNTER NOTE
Dear staff: Please kindly communicate with patient that 1 polyp removed during colonoscopy was benign but precancerous so discontinued that we found and removed it.  The other polyp removed did not show any significant findings.  Repeat colonoscopy will be due in 7 years.    Also please place in recall for colonoscopy in 7 years.

## 2024-07-22 ENCOUNTER — TELEPHONE (OUTPATIENT)
Dept: GASTROENTEROLOGY | Facility: CLINIC | Age: 49
End: 2024-07-22

## 2024-07-22 NOTE — TELEPHONE ENCOUNTER
Edyta Ricks  7/22/2024 10:11 AM EDT Back to Top      Message left using interpretor # [id number], with results and recommendations.  Patient asked to call [phone] with any questions or concerns.     7 yr colon recall entered.    Bebo Martínez MD  7/20/2024  8:32 AM EDT       Dear staff: Please kindly communicate with patient that 1 polyp removed during colonoscopy was benign but precancerous so discontinued that we found and removed it.  The other polyp removed did not show any significant findings.  Repeat colonoscopy will be due in 7 years.     Also please place in recall for colonoscopy in 7 years.

## 2024-08-18 ENCOUNTER — HOSPITAL ENCOUNTER (EMERGENCY)
Facility: HOSPITAL | Age: 49
Discharge: HOME | End: 2024-08-18
Attending: EMERGENCY MEDICINE

## 2024-08-18 ENCOUNTER — APPOINTMENT (EMERGENCY)
Dept: CT IMAGING | Facility: HOSPITAL | Age: 49
End: 2024-08-18

## 2024-08-18 VITALS
DIASTOLIC BLOOD PRESSURE: 75 MMHG | HEIGHT: 66 IN | RESPIRATION RATE: 15 BRPM | WEIGHT: 225.53 LBS | HEART RATE: 50 BPM | SYSTOLIC BLOOD PRESSURE: 133 MMHG | OXYGEN SATURATION: 97 % | BODY MASS INDEX: 36.25 KG/M2 | TEMPERATURE: 97.3 F

## 2024-08-18 DIAGNOSIS — K52.9 ACUTE COLITIS: ICD-10-CM

## 2024-08-18 DIAGNOSIS — R11.2 NAUSEA AND VOMITING: Primary | ICD-10-CM

## 2024-08-18 DIAGNOSIS — R42 LIGHTHEADEDNESS: ICD-10-CM

## 2024-08-18 DIAGNOSIS — R10.9 ABDOMINAL PAIN: ICD-10-CM

## 2024-08-18 DIAGNOSIS — R00.1 SINUS BRADYCARDIA: ICD-10-CM

## 2024-08-18 LAB
ANION GAP SERPL CALCULATED.3IONS-SCNC: 9 MMOL/L (ref 4–13)
APTT PPP: 24 SECONDS (ref 23–34)
BASOPHILS # BLD AUTO: 0.04 THOUSANDS/ÂΜL (ref 0–0.1)
BASOPHILS NFR BLD AUTO: 1 % (ref 0–1)
BUN SERPL-MCNC: 22 MG/DL (ref 5–25)
CALCIUM SERPL-MCNC: 9.5 MG/DL (ref 8.4–10.2)
CARDIAC TROPONIN I PNL SERPL HS: 3 NG/L
CHLORIDE SERPL-SCNC: 105 MMOL/L (ref 96–108)
CO2 SERPL-SCNC: 26 MMOL/L (ref 21–32)
CREAT SERPL-MCNC: 1.09 MG/DL (ref 0.6–1.3)
EOSINOPHIL # BLD AUTO: 0.13 THOUSAND/ÂΜL (ref 0–0.61)
EOSINOPHIL NFR BLD AUTO: 2 % (ref 0–6)
ERYTHROCYTE [DISTWIDTH] IN BLOOD BY AUTOMATED COUNT: 14.3 % (ref 11.6–15.1)
GFR SERPL CREATININE-BSD FRML MDRD: 78 ML/MIN/1.73SQ M
GLUCOSE SERPL-MCNC: 137 MG/DL (ref 65–140)
HCT VFR BLD AUTO: 41.2 % (ref 36.5–49.3)
HGB BLD-MCNC: 13.4 G/DL (ref 12–17)
IMM GRANULOCYTES # BLD AUTO: 0.04 THOUSAND/UL (ref 0–0.2)
IMM GRANULOCYTES NFR BLD AUTO: 1 % (ref 0–2)
INR PPP: 0.93 (ref 0.85–1.19)
LACTATE SERPL-SCNC: 1.3 MMOL/L (ref 0.5–2)
LIPASE SERPL-CCNC: 17 U/L (ref 11–82)
LYMPHOCYTES # BLD AUTO: 1.17 THOUSANDS/ÂΜL (ref 0.6–4.47)
LYMPHOCYTES NFR BLD AUTO: 21 % (ref 14–44)
MCH RBC QN AUTO: 27.5 PG (ref 26.8–34.3)
MCHC RBC AUTO-ENTMCNC: 32.5 G/DL (ref 31.4–37.4)
MCV RBC AUTO: 85 FL (ref 82–98)
MONOCYTES # BLD AUTO: 0.2 THOUSAND/ÂΜL (ref 0.17–1.22)
MONOCYTES NFR BLD AUTO: 4 % (ref 4–12)
NEUTROPHILS # BLD AUTO: 4.11 THOUSANDS/ÂΜL (ref 1.85–7.62)
NEUTS SEG NFR BLD AUTO: 71 % (ref 43–75)
NRBC BLD AUTO-RTO: 0 /100 WBCS
PLATELET # BLD AUTO: 224 THOUSANDS/UL (ref 149–390)
PMV BLD AUTO: 11 FL (ref 8.9–12.7)
POTASSIUM SERPL-SCNC: 3.9 MMOL/L (ref 3.5–5.3)
PROTHROMBIN TIME: 12.9 SECONDS (ref 12.3–15)
RBC # BLD AUTO: 4.87 MILLION/UL (ref 3.88–5.62)
SODIUM SERPL-SCNC: 140 MMOL/L (ref 135–147)
WBC # BLD AUTO: 5.69 THOUSAND/UL (ref 4.31–10.16)

## 2024-08-18 PROCEDURE — 80048 BASIC METABOLIC PNL TOTAL CA: CPT | Performed by: PHYSICIAN ASSISTANT

## 2024-08-18 PROCEDURE — 85610 PROTHROMBIN TIME: CPT | Performed by: PHYSICIAN ASSISTANT

## 2024-08-18 PROCEDURE — 93005 ELECTROCARDIOGRAM TRACING: CPT

## 2024-08-18 PROCEDURE — 70450 CT HEAD/BRAIN W/O DYE: CPT

## 2024-08-18 PROCEDURE — 96374 THER/PROPH/DIAG INJ IV PUSH: CPT

## 2024-08-18 PROCEDURE — 83690 ASSAY OF LIPASE: CPT | Performed by: PHYSICIAN ASSISTANT

## 2024-08-18 PROCEDURE — 99284 EMERGENCY DEPT VISIT MOD MDM: CPT

## 2024-08-18 PROCEDURE — 36415 COLL VENOUS BLD VENIPUNCTURE: CPT | Performed by: PHYSICIAN ASSISTANT

## 2024-08-18 PROCEDURE — 74176 CT ABD & PELVIS W/O CONTRAST: CPT

## 2024-08-18 PROCEDURE — 84484 ASSAY OF TROPONIN QUANT: CPT | Performed by: PHYSICIAN ASSISTANT

## 2024-08-18 PROCEDURE — 99285 EMERGENCY DEPT VISIT HI MDM: CPT | Performed by: PHYSICIAN ASSISTANT

## 2024-08-18 PROCEDURE — 96361 HYDRATE IV INFUSION ADD-ON: CPT

## 2024-08-18 PROCEDURE — 85730 THROMBOPLASTIN TIME PARTIAL: CPT | Performed by: PHYSICIAN ASSISTANT

## 2024-08-18 PROCEDURE — 85025 COMPLETE CBC W/AUTO DIFF WBC: CPT | Performed by: PHYSICIAN ASSISTANT

## 2024-08-18 PROCEDURE — 83605 ASSAY OF LACTIC ACID: CPT | Performed by: PHYSICIAN ASSISTANT

## 2024-08-18 RX ORDER — ONDANSETRON 2 MG/ML
4 INJECTION INTRAMUSCULAR; INTRAVENOUS ONCE
Status: COMPLETED | OUTPATIENT
Start: 2024-08-18 | End: 2024-08-18

## 2024-08-18 RX ORDER — DICYCLOMINE HYDROCHLORIDE 10 MG/1
10 CAPSULE ORAL EVERY 6 HOURS PRN
Qty: 20 CAPSULE | Refills: 0 | Status: SHIPPED | OUTPATIENT
Start: 2024-08-18

## 2024-08-18 RX ORDER — ONDANSETRON 4 MG/1
4 TABLET, ORALLY DISINTEGRATING ORAL EVERY 6 HOURS PRN
Qty: 20 TABLET | Refills: 0 | Status: SHIPPED | OUTPATIENT
Start: 2024-08-18

## 2024-08-18 RX ADMIN — SODIUM CHLORIDE 1000 ML: 0.9 INJECTION, SOLUTION INTRAVENOUS at 10:45

## 2024-08-18 RX ADMIN — ONDANSETRON 4 MG: 2 INJECTION INTRAMUSCULAR; INTRAVENOUS at 10:45

## 2024-08-18 NOTE — ED PROVIDER NOTES
History  Chief Complaint   Patient presents with    Dizziness     Patient reports dizziness and vomiting beginning this morning. Denies any pain. Hx of R nephrectomy two weeks ago.     49-year-old male with past medical history significant for gastroesophageal reflux disease and renal cell carcinoma status post right nephrectomy on June 18, 2024 presents emergency department for evaluation of dizziness nausea and vomiting that started suddenly this morning.  Patient reports mild general epigastric abdominal cramping associated with multiple episodes of nonbloody, nonbilious vomiting/he reports associated with an episode of lightheadedness and generalized tremors that lasted for a few second and resolved. This episode occurred during an episode of vomiting. Denies fevers or chills, chest pain, shortness of breath or syncope.  He reports no hematuria, dysuria or urinary retention.        History provided by:  Patient and relative   used: No    Dizziness  Associated symptoms: nausea and vomiting    Associated symptoms: no chest pain, no diarrhea, no palpitations, no shortness of breath and no weakness        Prior to Admission Medications   Prescriptions Last Dose Informant Patient Reported? Taking?   acetaminophen (TYLENOL) 325 mg tablet   No No   Sig: Take 2 tablets (650 mg total) by mouth every 6 (six) hours   docusate sodium (COLACE) 100 mg capsule   No No   Sig: Take 1 capsule (100 mg total) by mouth 2 (two) times a day      Facility-Administered Medications: None       Past Medical History:   Diagnosis Date    Cancer (HCC)     Kidney Right removed    Diverticulosis     GERD (gastroesophageal reflux disease)     Renal mass, right     rt nephrectomy today 6/17/2024       Past Surgical History:   Procedure Laterality Date    NO PAST SURGERIES      CA LAPAROSCOPY RADICAL NEPHRECTOMY Right 6/17/2024    Procedure: NEPHRECTOMY RADICAL  W/ ROBOTICS;  Surgeon: Chapincito Corea MD;  Location: [facility]  OR;  Service: Urology       Family History   Problem Relation Age of Onset    Cancer Mother         vaginal    No Known Problems Father      I have reviewed and agree with the history as documented.    E-Cigarette/Vaping    E-Cigarette Use Never User      E-Cigarette/Vaping Substances     Social History     Tobacco Use    Smoking status: Former     Types: Cigarettes    Smokeless tobacco: Never    Tobacco comments:     Was a social smoker, quit in 2019   Vaping Use    Vaping status: Never Used   Substance Use Topics    Alcohol use: Yes     Comment: occasionally, on weekends 1 x    Drug use: Not Currently       Review of Systems   Constitutional:  Negative for diaphoresis and fever.   Eyes:  Negative for visual disturbance.   Respiratory:  Negative for chest tightness, shortness of breath and stridor.    Cardiovascular:  Negative for chest pain, palpitations and leg swelling.   Gastrointestinal:  Positive for abdominal pain, nausea and vomiting. Negative for constipation and diarrhea.   Genitourinary:  Negative for decreased urine volume, difficulty urinating, dysuria, flank pain and hematuria.   Musculoskeletal:  Negative for gait problem, neck pain and neck stiffness.   Skin:  Negative for rash.   Neurological:  Positive for dizziness, tremors and light-headedness. Negative for seizures, syncope, facial asymmetry, weakness and numbness.   All other systems reviewed and are negative.      Physical Exam  Physical Exam  Vitals and nursing note reviewed.   Constitutional:       General: He is not in acute distress.     Appearance: Normal appearance.   HENT:      Head: Normocephalic and atraumatic.      Right Ear: External ear normal.      Left Ear: External ear normal.      Nose: Nose normal.      Mouth/Throat:      Mouth: Mucous membranes are dry.   Eyes:      General: No scleral icterus.        Right eye: No discharge.         Left eye: No discharge.      Extraocular Movements: Extraocular movements intact.       Comments: Mild right gaze horizontal nystagmus present.  EOMI, no visual field cuts.  Pupils 4 mm equal round reactive to light bilaterally   Cardiovascular:      Rate and Rhythm: Normal rate.      Pulses: Normal pulses.   Pulmonary:      Effort: Pulmonary effort is normal.      Breath sounds: Normal breath sounds.   Abdominal:      General: Abdomen is flat.      Tenderness: There is no abdominal tenderness. There is no right CVA tenderness, left CVA tenderness, guarding or rebound.      Comments: Surgical sites across abdomen all appear clean dry and intact without dehiscence, drainage, bleeding or surrounding erythema   Musculoskeletal:         General: No tenderness, deformity or signs of injury.      Cervical back: Normal range of motion and neck supple. No rigidity or tenderness.   Skin:     General: Skin is dry.      Capillary Refill: Capillary refill takes less than 2 seconds.      Coloration: Skin is not jaundiced.      Findings: No erythema or rash.   Neurological:      General: No focal deficit present.      Mental Status: He is alert and oriented to person, place, and time. Mental status is at baseline.      Motor: No weakness.   Psychiatric:         Mood and Affect: Mood normal.         Behavior: Behavior normal.         Thought Content: Thought content normal.         Vital Signs  ED Triage Vitals   Temperature Pulse Respirations Blood Pressure SpO2   08/18/24 1130 08/18/24 1016 08/18/24 1016 08/18/24 1016 08/18/24 1016   (!) 97.3 °F (36.3 °C) (!) 52 18 170/93 93 %      Temp src Heart Rate Source Patient Position - Orthostatic VS BP Location FiO2 (%)   -- 08/18/24 1016 08/18/24 1016 08/18/24 1016 --    Monitor Lying Right arm       Pain Score       08/18/24 1016       No Pain           Vitals:    08/18/24 1016 08/18/24 1100 08/18/24 1130 08/18/24 1200   BP: 170/93 129/80 141/77 133/75   Pulse: (!) 52 55 (!) 51 (!) 50   Patient Position - Orthostatic VS: Lying            Visual Acuity      ED  Medications  Medications   sodium chloride 0.9 % bolus 1,000 mL (0 mL Intravenous Stopped 8/18/24 1224)   ondansetron (ZOFRAN) injection 4 mg (4 mg Intravenous Given 8/18/24 1045)       Diagnostic Studies  Results Reviewed       Procedure Component Value Units Date/Time    HS Troponin 0hr (reflex protocol) [[id number]]  (Normal) Collected: 08/18/24 1032    Lab Status: Final result Specimen: Blood from Arm, Left Updated: 08/18/24 1104     hs TnI 0hr 3 ng/L     Lactic acid, plasma (w/reflex if result > 2.0) [[id number]]  (Normal) Collected: 08/18/24 1032    Lab Status: Final result Specimen: Blood from Arm, Left Updated: 08/18/24 1057     LACTIC ACID 1.3 mmol/L     Narrative:      Result may be elevated if tourniquet was used during collection.    Basic metabolic panel [[id number]] Collected: 08/18/24 1032    Lab Status: Final result Specimen: Blood from Arm, Left Updated: 08/18/24 1057     Sodium 140 mmol/L      Potassium 3.9 mmol/L      Chloride 105 mmol/L      CO2 26 mmol/L      ANION GAP 9 mmol/L      BUN 22 mg/dL      Creatinine 1.09 mg/dL      Glucose 137 mg/dL      Calcium 9.5 mg/dL      eGFR 78 ml/min/1.73sq m     Narrative:      National Kidney Disease Foundation guidelines for Chronic Kidney Disease (CKD):     Stage 1 with normal or high GFR (GFR > 90 mL/min/1.73 square meters)    Stage 2 Mild CKD (GFR = 60-89 mL/min/1.73 square meters)    Stage 3A Moderate CKD (GFR = 45-59 mL/min/1.73 square meters)    Stage 3B Moderate CKD (GFR = 30-44 mL/min/1.73 square meters)    Stage 4 Severe CKD (GFR = 15-29 mL/min/1.73 square meters)    Stage 5 End Stage CKD (GFR <15 mL/min/1.73 square meters)  Note: GFR calculation is accurate only with a steady state creatinine    Lipase [[id number]]  (Normal) Collected: 08/18/24 1032    Lab Status: Final result Specimen: Blood from Arm, Left Updated: 08/18/24 1057     Lipase 17 u/L     Protime-INR [[id number]]  (Normal) Collected: 08/18/24 1032    Lab Status: Final result Specimen:  Blood from Arm, Left Updated: 08/18/24 1052     Protime 12.9 seconds      INR 0.93    Narrative:      INR Therapeutic Range    Indication                                             INR Range      Atrial Fibrillation                                               2.0-3.0  Hypercoagulable State                                    2.0.2.3  Left Ventricular Asist Device                            2.0-3.0  Mechanical Heart Valve                                  -    Aortic(with afib, MI, embolism, HF, LA enlargement,    and/or coagulopathy)                                     2.0-3.0 (2.5-3.5)     Mitral                                                             2.5-3.5  Prosthetic/Bioprosthetic Heart Valve               2.0-3.0  Venous thromboembolism (VTE: VT, PE        2.0-3.0    APTT [[id number]]  (Normal) Collected: 08/18/24 1032    Lab Status: Final result Specimen: Blood from Arm, Left Updated: 08/18/24 1052     PTT 24 seconds     CBC and differential [[id number]] Collected: 08/18/24 1032    Lab Status: Final result Specimen: Blood from Arm, Left Updated: 08/18/24 1043     WBC 5.69 Thousand/uL      RBC 4.87 Million/uL      Hemoglobin 13.4 g/dL      Hematocrit 41.2 %      MCV 85 fL      MCH 27.5 pg      MCHC 32.5 g/dL      RDW 14.3 %      MPV 11.0 fL      Platelets 224 Thousands/uL      nRBC 0 /100 WBCs      Segmented % 71 %      Immature Grans % 1 %      Lymphocytes % 21 %      Monocytes % 4 %      Eosinophils Relative 2 %      Basophils Relative 1 %      Absolute Neutrophils 4.11 Thousands/µL      Absolute Immature Grans 0.04 Thousand/uL      Absolute Lymphocytes 1.17 Thousands/µL      Absolute Monocytes 0.20 Thousand/µL      Eosinophils Absolute 0.13 Thousand/µL      Basophils Absolute 0.04 Thousands/µL                    CT head without contrast   Final Result by Vahid Parks MD (08/18 1140)      No acute intracranial abnormality.                  Resident: ALEENA Donahue I, the attending radiologist, have  reviewed the images and agree with the final report above.      Workstation performed: [id number]         CT abdomen pelvis wo contrast   Final Result by Vahid Parks MD (08/18 1155)      1. Mild pericolonic fat stranding adjacent to the mid to distal ascending colon with possibly mild bowel wall thickening, nonspecific. This may also just be sequela of recent surgery. Correlate clinically to exclude an infectious/inflammatory colitis of    the ascending colon.      2. Status post right nephrectomy without findings of suspicious mass in the right renal bed.         Resident: ALEENA Donahue I, the attending radiologist, have reviewed the images and agree with the final report above.      Workstation performed: [id number]                    Procedures  ECG 12 Lead Documentation Only    Date/Time: 8/18/2024 10:16 AM    Performed by: Stanislaw Chicas PA-C  Authorized by: Stanislaw Chicas PA-C    Indications / Diagnosis:  Dizziness  ECG reviewed by me, the ED Provider: yes    Patient location:  ED  Previous ECG:     Previous ECG:  Unavailable    Comparison to cardiac monitor: Yes    Interpretation:     Interpretation: normal    Rate:     ECG rate:  53    ECG rate assessment: bradycardic    Rhythm:     Rhythm: sinus bradycardia    Ectopy:     Ectopy: none    QRS:     QRS axis:  Normal    QRS intervals:  Normal  Conduction:     Conduction: normal    ST segments:     ST segments:  Normal  T waves:     T waves: normal             ED Course  ED Course as of 08/18/24 1446   Sun Aug 18, 2024   1041 Re-evaluation.  Patient now vomiting.  IV zofran ordered.     1142 Reevaluation: No additional vomiting since IV Zofran..  BP stable at 129/80.  Continue to monitor.     1142 Lactic acid, plasma (w/reflex if result > 2.0)  Initial lactic acid 1.3 is normal.      1142 HS Troponin 0hr (reflex protocol)  Initial troponin is 3, normal, patient without chest pain, SOB, MOORE or syncope. low suspicion for ACS   1143  Lipase  Lipase normal at 17, doubt pancreatitis.    1143 CBC and differential  Reviewed.  Normal WBC, hgb and hct.  No anemia.     1143 Basic metabolic panel  BMP reviewed. Normal electrolyte and renal function.  No hypoglycemia.   No renal failure.    1144 CT head without contrast  Ct head images viewed by me. Radiology report reviewed: no acute intracranial abnormality.     1209 Ct a/p results reviewed: Mild pericolonic fat stranding adjacent to the mid to distal ascending colon with possibly mild bowel wall thickening, nonspecific. This may also just be sequela of recent surgery. Correlate clinically to exclude an infectious/inflammatory colitis of   the ascending colon. Status post right nephrectomy without findings of suspicious mass in the right renal bed.     1210 Re-evaluation.  Symptoms controlled after iv zofran and iv fluids in ED.  Ct a/p demonstrated colitis which is consistent with nausea/vomiting symptoms.  Suspect lightheadedness due to vasovagal episode from vomiting.  No hypoglycemia,  resolved with iv fluids.  No acute findings in surgical bed for abscess.     1215 HR during ED course is routinely 50-60s.  Reviewed prior records, including anesthesia note from 6/17, recent hospital admission and discharge summaries which demonstrate patient baseline HR 50-67.  HR today is not new and appears consistent with baseline.     No additional workup indicated.    1220 Re-evaluation. HR bradycardic at baseline, remaining vital signs normal.  No vomiting.  Able to ambulate with stable gait.  Patient improved and stable for discharge.                 HEART Risk Score      Flowsheet Row Most Recent Value   Heart Score Risk Calculator    History 0 Filed at: 08/18/2024 1446   ECG 0 Filed at: 08/18/2024 1446   Age 1 Filed at: 08/18/2024 1446   Risk Factors 1 Filed at: 08/18/2024 1446   Troponin 0 Filed at: 08/18/2024 1446   HEART Score 2 Filed at: 08/18/2024 1446                          SBIRT 20yo+       Flowsheet Row Most Recent Value   Initial Alcohol Screen: US AUDIT-C     1. How often do you have a drink containing alcohol? 0 Filed at: 08/18/2024 1016   2. How many drinks containing alcohol do you have on a typical day you are drinking?  0 Filed at: 08/18/2024 1016   3a. Male UNDER 65: How often do you have five or more drinks on one occasion? 0 Filed at: 08/18/2024 1016   3b. FEMALE Any Age, or MALE 65+: How often do you have 4 or more drinks on one occassion? 0 Filed at: 08/18/2024 1016   Audit-C Score 0 Filed at: 08/18/2024 1016   JOSE: How many times in the past year have you...    Used an illegal drug or used a prescription medication for non-medical reasons? Never Filed at: 08/18/2024 1016                      Medical Decision Making  MDM: 49 year old male s/p R nephrectomy for RCC 2 months ago with nausea, vomiting, dizziness and generalized abdominal pain.     DDX: Based on my history physical exam and review of patient co-morbidities , bowel obstruction, ileus, surgical site abscess, pyelonephritis, UTI, colitis, gastritis, gastroenteritis, cholecystitis, pancreatitis, diverticulitis, cancer, metastasis, mesenteric ischemia, dehydration, hypoglycemia, renal failure, electrolyte abnormality.    Will obtain CBC to evaluate for blood loss anemia, white blood cell count for infection.  Check chemistry panel -expect mildly elevated renal function secondary to recent right nephrectomy, solitary kidney-  to evaluate for hepato-renal function, electrolyte abnormalities or hypoglycemia.  Will check head CT to evaluate for intracranial hemorrhage.  Had EKG and troponin to evaluate for cardiac arrhythmia, ACS causing dizziness lightheadedness.  Check CT abdomen pelvis without contrast to evaluate for intra-abdominal source of nausea and vomiting.  Will give IV fluids, IV Zofran for nausea and vomiting. And reassess after treatment.     Patient has been medically evaluated for potential limb- or  life-threatening conditions that may lead to permanent organ injury or dysfunction. I reviewed all vital signs, nursing notes, and other relevant history, physical exam and ancillary testing.    EKG ordered and interpreted by me.   My initial interpretation: Sinus bradycardia, slow rate, normal rhythm.  Normal axis, no ST elevation or ischemic changes    Final Assessment (see ED course for additional MDM): acute colitis causing nausea, vomiting and abdominal pain.   Renal cell carcinoma s/p R nephrectomy without evidence of surgical site infection or abscess.  Sinus bradycardia.  Transient dizziness associated with vomiting - resolved. Suspect vasovagal episode related to vomiting.        On Reassessment: nausea and vomiting improved after iv Zofran.  Labs normal.   Ct abd/pelvis demonstrates nonspecific colitis which is consistent with patients clinical symptoms.  Head ct and cardiac workup negative. Bradycardic heart rate at baseline, remaining vital signs normal. Patient able to ambulate with stable gait here in ED after treatment.  Will TX nausea/vomiting with Zofran ODT.  Add bentyl for abdominal cramping/colitis.  Instructed encourage fluids, bland diet.  Stable for discharge and outpatient follow up with PCP and gastroenterology.  Return to ED precautions given.                    Amount and/or Complexity of Data Reviewed  Labs: ordered. Decision-making details documented in ED Course.  Radiology: ordered. Decision-making details documented in ED Course.    Risk  Prescription drug management.                 Disposition  Final diagnoses:   Nausea and vomiting   Acute colitis   Abdominal pain   Lightheadedness   Sinus bradycardia     Time reflects when diagnosis was documented in both MDM as applicable and the Disposition within this note       Time User Action Codes Description Comment    8/18/2024 12:16 PM Stanislaw Chicas Add [R11.2] Nausea and vomiting     8/18/2024 12:16 PM Stanislaw Chicas Add [K52.9] Acute  colitis     8/18/2024 12:17 PM Stanislaw Chicas Add [R10.9] Abdominal pain     8/18/2024 12:17 PM Stanislaw Chicas Add [R42] Lightheadedness     8/18/2024 12:17 PM Stanislaw Chicas Add [R00.1] Sinus bradycardia           ED Disposition       ED Disposition   Discharge    Condition   Stable    Date/Time   Sun Aug 18, 2024 1216    Comment   Pj Vences discharge to home/self care.                   Follow-up Information       Follow up With Specialties Details Why Contact Info Additional Information    [facility] Emergency Medicine Go to  If symptoms worsen [address] [facility] [id number] [phone] [facility], [address], [place], [address]    Amaris Junior PA-C Family Medicine, Physician Assistant Call in 3 days for further evaluation of symptoms [address] [facility] [address]  [phone]       Bebo Martínez MD Gastroenterology, Internal Medicine Call in 1 week for further evaluation of nausea/vomiting/colitis symptoms [address] [address]  [phone]               Discharge Medication List as of 8/18/2024 12:20 PM        START taking these medications    Details   dicyclomine (BENTYL) 10 mg capsule Take 1 capsule (10 mg total) by mouth every 6 (six) hours as needed (abd cramping), Starting Sun 8/18/2024, Normal      ondansetron (ZOFRAN-ODT) 4 mg disintegrating tablet Take 1 tablet (4 mg total) by mouth every 6 (six) hours as needed for nausea or vomiting, Starting Sun 8/18/2024, Normal           CONTINUE these medications which have NOT CHANGED    Details   acetaminophen (TYLENOL) 325 mg tablet Take 2 tablets (650 mg total) by mouth every 6 (six) hours, Starting Wed 6/19/2024, Normal      docusate sodium (COLACE) 100 mg capsule Take 1 capsule (100 mg total) by mouth 2 (two) times a day, Starting Wed 6/19/2024, Normal             No discharge procedures on file.    PDMP  Review         Value Time User    PDMP Reviewed  Yes 5/16/2024  1:44 AM Phillip Solomon PA-C            ED Provider  Electronically Signed by             Stanislaw Chicas PA-C  08/18/24 [id number]

## 2024-08-19 ENCOUNTER — TELEPHONE (OUTPATIENT)
Age: 49
End: 2024-08-19

## 2024-08-19 LAB
ATRIAL RATE: 53 BPM
P AXIS: 33 DEGREES
PR INTERVAL: 166 MS
QRS AXIS: 39 DEGREES
QRSD INTERVAL: 110 MS
QT INTERVAL: 420 MS
QTC INTERVAL: 394 MS
T WAVE AXIS: 29 DEGREES
VENTRICULAR RATE: 53 BPM

## 2024-08-19 PROCEDURE — 93010 ELECTROCARDIOGRAM REPORT: CPT | Performed by: INTERNAL MEDICINE

## 2024-08-19 NOTE — TELEPHONE ENCOUNTER
Pt calling for ED f/u. No available appt until Nov. Pt is reaching out to hospital to see what he can do. Pt will call back.

## 2025-02-04 ENCOUNTER — TELEPHONE (OUTPATIENT)
Dept: UROLOGY | Facility: MEDICAL CENTER | Age: 50
End: 2025-02-04

## 2025-02-05 NOTE — TELEPHONE ENCOUNTER
Patient returned call to the office. Informed him with help of  to obtain BMP prior to appointment. Patient verbalized understanding.

## 2025-02-07 ENCOUNTER — APPOINTMENT (OUTPATIENT)
Dept: LAB | Facility: HOSPITAL | Age: 50
End: 2025-02-07
Payer: COMMERCIAL

## 2025-02-07 DIAGNOSIS — C64.1 RENAL CELL CARCINOMA OF RIGHT KIDNEY (HCC): ICD-10-CM

## 2025-02-07 LAB
ANION GAP SERPL CALCULATED.3IONS-SCNC: 8 MMOL/L (ref 4–13)
BUN SERPL-MCNC: 21 MG/DL (ref 5–25)
CALCIUM SERPL-MCNC: 9 MG/DL (ref 8.4–10.2)
CHLORIDE SERPL-SCNC: 105 MMOL/L (ref 96–108)
CO2 SERPL-SCNC: 25 MMOL/L (ref 21–32)
CREAT SERPL-MCNC: 1.11 MG/DL (ref 0.6–1.3)
GFR SERPL CREATININE-BSD FRML MDRD: 77 ML/MIN/1.73SQ M
GLUCOSE P FAST SERPL-MCNC: 107 MG/DL (ref 65–99)
POTASSIUM SERPL-SCNC: 3.9 MMOL/L (ref 3.5–5.3)
SODIUM SERPL-SCNC: 138 MMOL/L (ref 135–147)

## 2025-02-07 PROCEDURE — 80048 BASIC METABOLIC PNL TOTAL CA: CPT

## 2025-02-07 PROCEDURE — 36415 COLL VENOUS BLD VENIPUNCTURE: CPT

## 2025-02-13 ENCOUNTER — OFFICE VISIT (OUTPATIENT)
Dept: UROLOGY | Facility: MEDICAL CENTER | Age: 50
End: 2025-02-13

## 2025-02-13 VITALS
BODY MASS INDEX: 39.37 KG/M2 | DIASTOLIC BLOOD PRESSURE: 76 MMHG | HEART RATE: 58 BPM | OXYGEN SATURATION: 97 % | HEIGHT: 66 IN | WEIGHT: 245 LBS | SYSTOLIC BLOOD PRESSURE: 112 MMHG

## 2025-02-13 DIAGNOSIS — C64.1 RENAL CELL CARCINOMA OF RIGHT KIDNEY (HCC): Primary | ICD-10-CM

## 2025-02-13 DIAGNOSIS — N40.1 BPH WITH LOWER URINARY TRACT SYMPTOMS WITHOUT URINARY OBSTRUCTION: ICD-10-CM

## 2025-02-13 LAB
BACTERIA UR QL AUTO: ABNORMAL /HPF
BILIRUB UR QL STRIP: NEGATIVE
CLARITY UR: CLEAR
COLOR UR: ABNORMAL
GLUCOSE UR STRIP-MCNC: NEGATIVE MG/DL
HGB UR QL STRIP.AUTO: NEGATIVE
HYALINE CASTS #/AREA URNS LPF: ABNORMAL /LPF
KETONES UR STRIP-MCNC: NEGATIVE MG/DL
LEUKOCYTE ESTERASE UR QL STRIP: NEGATIVE
NITRITE UR QL STRIP: NEGATIVE
NON-SQ EPI CELLS URNS QL MICRO: ABNORMAL /HPF
PH UR STRIP.AUTO: 5.5 [PH]
POST-VOID RESIDUAL VOLUME, ML POC: 109 ML
PROT UR STRIP-MCNC: ABNORMAL MG/DL
RBC #/AREA URNS AUTO: ABNORMAL /HPF
SL AMB  POCT GLUCOSE, UA: ABNORMAL
SL AMB LEUKOCYTE ESTERASE,UA: ABNORMAL
SL AMB POCT BILIRUBIN,UA: ABNORMAL
SL AMB POCT BLOOD,UA: ABNORMAL
SL AMB POCT CLARITY,UA: CLEAR
SL AMB POCT COLOR,UA: YELLOW
SL AMB POCT KETONES,UA: ABNORMAL
SL AMB POCT NITRITE,UA: ABNORMAL
SL AMB POCT PH,UA: 5.5
SL AMB POCT SPECIFIC GRAVITY,UA: >=1.03
SL AMB POCT URINE PROTEIN: 30
SL AMB POCT UROBILINOGEN: 0.2
SP GR UR STRIP.AUTO: 1.03 (ref 1–1.03)
UROBILINOGEN UR STRIP-ACNC: <2 MG/DL
WBC #/AREA URNS AUTO: ABNORMAL /HPF

## 2025-02-13 PROCEDURE — 81003 URINALYSIS AUTO W/O SCOPE: CPT | Performed by: UROLOGY

## 2025-02-13 PROCEDURE — 51798 US URINE CAPACITY MEASURE: CPT | Performed by: UROLOGY

## 2025-02-13 PROCEDURE — 99214 OFFICE O/P EST MOD 30 MIN: CPT | Performed by: UROLOGY

## 2025-02-13 PROCEDURE — 81001 URINALYSIS AUTO W/SCOPE: CPT | Performed by: UROLOGY

## 2025-02-13 NOTE — ASSESSMENT & PLAN NOTE
Right RCC s/p robotic right radical nephrectomy om 6/17/2024  pT1b clear cell RCC, G1, negative margins  Low Risk category per AUA guidelines  GFR 77 on 2/7/2025  - will get baseline postop CT chest/abdomen/pelvis and then continue annual imaging x 5 years      Orders:    Urinalysis with microscopic    CT chest wo contrast; Future    CT abdomen pelvis w wo contrast; Future

## 2025-02-13 NOTE — PROGRESS NOTES
Name: Pj Vences      : 1975      MRN: [id number]  Encounter Provider: Chapincito Corea MD  Encounter Date: 2025   Encounter department: [facility] FOR UROLOGY [place]  :  Assessment & Plan  Renal cell carcinoma of right kidney (HCC)  Right RCC s/p robotic right radical nephrectomy om 2024  pT1b clear cell RCC, G1, negative margins  Low Risk category per AUA guidelines  GFR 77 on 2025  - will get baseline postop CT chest/abdomen/pelvis and then continue annual imaging x 5 years      Orders:    Urinalysis with microscopic    CT chest wo contrast; Future    CT abdomen pelvis w wo contrast; Future    BPH with lower urinary tract symptoms without urinary obstruction  Nocturia x 2  No obstructive symptoms   cc on bladder scan  - UA  - PSA  - limit fluid intake prior to bedtime    Orders:    PSA Total, Diagnostic; Future        History of Present Illness   Pj Vences is a 49 y.o. male who presents for follow up.      Reports voiding q3 hours   Nocturia x 2  Reports strong flow of urine, no pushing/straining to void  No hematuria or dysuria    Reports anxiety and feeling light headed   No palpitations  Had syncopal episode back in 2024 and had normal imaging studies  Has not seen PCP   Occasional nausea and vomiting      Review of Systems   All other systems reviewed and are negative.    Past Medical History   Past Medical History:   Diagnosis Date    Cancer (HCC)     Kidney Right removed    Diverticulosis     GERD (gastroesophageal reflux disease)     Renal mass, right     rt nephrectomy today 2024     Past Surgical History:   Procedure Laterality Date    NO PAST SURGERIES      UT LAPAROSCOPY RADICAL NEPHRECTOMY Right 2024    Procedure: NEPHRECTOMY RADICAL  W/ ROBOTICS;  Surgeon: Chapincito Corea MD;  Location: [facility] OR;  Service: Urology     Family History   Problem Relation Age of Onset    No Known Problems Father     Cancer Mother         vaginal      reports  "that he quit smoking about 6 years ago. His smoking use included cigarettes. He has never used smokeless tobacco. He reports current alcohol use. He reports that he does not currently use drugs.  Current Outpatient Medications on File Prior to Visit   Medication Sig Dispense Refill    acetaminophen (TYLENOL) 325 mg tablet Take 2 tablets (650 mg total) by mouth every 6 (six) hours 50 tablet 0    dicyclomine (BENTYL) 10 mg capsule Take 1 capsule (10 mg total) by mouth every 6 (six) hours as needed (abd cramping) (Patient not taking: Reported on 2/13/2025) 20 capsule 0    docusate sodium (COLACE) 100 mg capsule Take 1 capsule (100 mg total) by mouth 2 (two) times a day (Patient not taking: Reported on 2/13/2025) 28 capsule 0    ondansetron (ZOFRAN-ODT) 4 mg disintegrating tablet Take 1 tablet (4 mg total) by mouth every 6 (six) hours as needed for nausea or vomiting (Patient not taking: Reported on 2/13/2025) 20 tablet 0     No current facility-administered medications on file prior to visit.   No Known Allergies      Objective   /76 (BP Location: Left arm, Patient Position: Sitting, Cuff Size: Extra-Large)   Pulse 58   Ht 5' 6\" (1.676 m)   Wt 111 kg (245 lb)   SpO2 97%   BMI 39.54 kg/m²     Physical Exam  Vitals and nursing note reviewed.   Constitutional:       General: He is not in acute distress.     Appearance: He is well-developed.   HENT:      Head: Normocephalic and atraumatic.   Eyes:      Conjunctiva/sclera: Conjunctivae normal.   Cardiovascular:      Rate and Rhythm: Normal rate and regular rhythm.      Heart sounds: No murmur heard.  Pulmonary:      Effort: Pulmonary effort is normal. No respiratory distress.      Breath sounds: Normal breath sounds.   Abdominal:      Palpations: Abdomen is soft.      Tenderness: There is no abdominal tenderness.   Genitourinary:     Comments: ZACH: prostate smooth, no nodules, non-tender  Musculoskeletal:         General: No swelling.      Cervical back: Neck " "supple.   Skin:     General: Skin is warm and dry.      Capillary Refill: Capillary refill takes less than 2 seconds.   Neurological:      Mental Status: He is alert.   Psychiatric:         Mood and Affect: Mood normal.          Results  No results found for: \"PSA\"  Lab Results   Component Value Date    CALCIUM 9.0 02/07/2025    K 3.9 02/07/2025    CO2 25 02/07/2025     02/07/2025    BUN 21 02/07/2025    CREATININE 1.11 02/07/2025     Lab Results   Component Value Date    WBC 5.69 08/18/2024    HGB 13.4 08/18/2024    HCT 41.2 08/18/2024    MCV 85 08/18/2024     08/18/2024       Office Urine Dip  No results found for this or any previous visit (from the past hour).]      "

## 2025-02-19 ENCOUNTER — APPOINTMENT (OUTPATIENT)
Dept: RADIOLOGY | Facility: HOSPITAL | Age: 50
End: 2025-02-19
Payer: COMMERCIAL

## 2025-02-19 ENCOUNTER — HOSPITAL ENCOUNTER (OUTPATIENT)
Dept: CT IMAGING | Facility: HOSPITAL | Age: 50
Discharge: HOME | End: 2025-02-19
Attending: UROLOGY
Payer: COMMERCIAL

## 2025-02-19 DIAGNOSIS — C64.1 RENAL CELL CARCINOMA OF RIGHT KIDNEY (HCC): ICD-10-CM

## 2025-02-19 PROCEDURE — 74178 CT ABD&PLV WO CNTR FLWD CNTR: CPT

## 2025-02-19 PROCEDURE — 71250 CT THORAX DX C-: CPT

## 2025-02-19 RX ADMIN — IOHEXOL 100 ML: 350 INJECTION, SOLUTION INTRAVENOUS at 07:42

## 2025-02-24 ENCOUNTER — RESULTS FOLLOW-UP (OUTPATIENT)
Dept: UROLOGY | Facility: MEDICAL CENTER | Age: 50
End: 2025-02-24

## 2025-02-24 NOTE — RESULT ENCOUNTER NOTE
Inform pt that the  test was normal.Good-no evidence of spread or recurrence of cancer. Keep usual follow up appt.

## 2025-03-18 ENCOUNTER — APPOINTMENT (OUTPATIENT)
Dept: LAB | Facility: HOSPITAL | Age: 50
End: 2025-03-18
Payer: COMMERCIAL

## 2025-03-18 ENCOUNTER — OFFICE VISIT (OUTPATIENT)
Dept: FAMILY MEDICINE CLINIC | Facility: CLINIC | Age: 50
End: 2025-03-18

## 2025-03-18 VITALS
BODY MASS INDEX: 38.41 KG/M2 | DIASTOLIC BLOOD PRESSURE: 72 MMHG | TEMPERATURE: 96.8 F | WEIGHT: 239 LBS | SYSTOLIC BLOOD PRESSURE: 132 MMHG | HEIGHT: 66 IN | HEART RATE: 76 BPM | RESPIRATION RATE: 16 BRPM

## 2025-03-18 DIAGNOSIS — N40.1 BPH WITH LOWER URINARY TRACT SYMPTOMS WITHOUT URINARY OBSTRUCTION: ICD-10-CM

## 2025-03-18 DIAGNOSIS — R11.10 VOMITING, UNSPECIFIED VOMITING TYPE, UNSPECIFIED WHETHER NAUSEA PRESENT: ICD-10-CM

## 2025-03-18 DIAGNOSIS — E55.9 VITAMIN D DEFICIENCY: ICD-10-CM

## 2025-03-18 DIAGNOSIS — Z76.89 ENCOUNTER TO ESTABLISH CARE: ICD-10-CM

## 2025-03-18 DIAGNOSIS — C64.1 RENAL CELL CARCINOMA OF RIGHT KIDNEY (HCC): Primary | ICD-10-CM

## 2025-03-18 DIAGNOSIS — R42 VERTIGO: ICD-10-CM

## 2025-03-18 DIAGNOSIS — R73.01 ELEVATED FASTING GLUCOSE: ICD-10-CM

## 2025-03-18 LAB
25(OH)D3 SERPL-MCNC: 10.1 NG/ML (ref 30–100)
ALBUMIN SERPL BCG-MCNC: 4.7 G/DL (ref 3.5–5)
ALP SERPL-CCNC: 81 U/L (ref 34–104)
ALT SERPL W P-5'-P-CCNC: 30 U/L (ref 7–52)
ANION GAP SERPL CALCULATED.3IONS-SCNC: 6 MMOL/L (ref 4–13)
AST SERPL W P-5'-P-CCNC: 21 U/L (ref 13–39)
BILIRUB SERPL-MCNC: 0.47 MG/DL (ref 0.2–1)
BUN SERPL-MCNC: 18 MG/DL (ref 5–25)
CALCIUM SERPL-MCNC: 9.6 MG/DL (ref 8.4–10.2)
CHLORIDE SERPL-SCNC: 103 MMOL/L (ref 96–108)
CO2 SERPL-SCNC: 28 MMOL/L (ref 21–32)
CREAT SERPL-MCNC: 1.11 MG/DL (ref 0.6–1.3)
EST. AVERAGE GLUCOSE BLD GHB EST-MCNC: 134 MG/DL
GFR SERPL CREATININE-BSD FRML MDRD: 77 ML/MIN/1.73SQ M
GLUCOSE P FAST SERPL-MCNC: 102 MG/DL (ref 65–99)
HBA1C MFR BLD: 6.3 %
POTASSIUM SERPL-SCNC: 4 MMOL/L (ref 3.5–5.3)
PROT SERPL-MCNC: 7.9 G/DL (ref 6.4–8.4)
PSA SERPL-MCNC: 0.45 NG/ML (ref 0–4)
SODIUM SERPL-SCNC: 137 MMOL/L (ref 135–147)

## 2025-03-18 PROCEDURE — 80053 COMPREHEN METABOLIC PANEL: CPT

## 2025-03-18 PROCEDURE — 84153 ASSAY OF PSA TOTAL: CPT

## 2025-03-18 PROCEDURE — 36415 COLL VENOUS BLD VENIPUNCTURE: CPT

## 2025-03-18 PROCEDURE — 99203 OFFICE O/P NEW LOW 30 MIN: CPT | Performed by: FAMILY MEDICINE

## 2025-03-18 PROCEDURE — 83036 HEMOGLOBIN GLYCOSYLATED A1C: CPT

## 2025-03-18 PROCEDURE — 82306 VITAMIN D 25 HYDROXY: CPT

## 2025-03-18 RX ORDER — PROCHLORPERAZINE MALEATE 5 MG/1
5 TABLET ORAL EVERY 6 HOURS PRN
Qty: 30 TABLET | Refills: 0 | Status: SHIPPED | OUTPATIENT
Start: 2025-03-18

## 2025-03-18 NOTE — ASSESSMENT & PLAN NOTE
Patient has a history of renal cell carcinoma of the right kidney has undergone a robotic right radical nephrectomy June 17, 2024

## 2025-03-18 NOTE — PROGRESS NOTES
Name: Pj Vences      : 1975      MRN: [id number]  Encounter Provider: Shamar Jenkins DO  Encounter Date: 3/18/2025   Encounter department: [facility] PRIMARY CARE  :  Assessment & Plan  Encounter for medical examination to establish care         Renal cell carcinoma of right kidney (HCC)  Patient has a history of renal cell carcinoma of the right kidney has undergone a robotic right radical nephrectomy 2024       Elevated fasting glucose  We will obtain a hemoglobin A1c  Orders:  •  Hemoglobin A1c (w/out EAG); Future  •  Comprehensive metabolic panel; Future    Vertigo  We will provide Compazine 5 mg up to 3 times daily  Orders:  •  prochlorperazine (COMPAZINE) 5 mg tablet; Take 1 tablet (5 mg total) by mouth every 6 (six) hours as needed for nausea or vomiting    Vomiting, unspecified vomiting type, unspecified whether nausea present  As above  Orders:  •  prochlorperazine (COMPAZINE) 5 mg tablet; Take 1 tablet (5 mg total) by mouth every 6 (six) hours as needed for nausea or vomiting    Vitamin D deficiency    Orders:  •  Vitamin D 25 hydroxy; Future          Depression Screening and Follow-up Plan: Patient was screened for depression during today's encounter. They screened negative with a PHQ-2 score of 0.        History of Present Illness   Patient presents to establish care, she complains of random dizziness with nausea and vomiting since his nephrectomy 2024 for renal cell carcinoma    Vomiting   Pertinent negatives include no abdominal pain, arthralgias, chest pain, chills, coughing or fever.   2024  Review of Systems   Constitutional:  Negative for chills and fever.   HENT:  Negative for ear pain and sore throat.    Eyes:  Negative for pain and visual disturbance.   Respiratory:  Negative for cough and shortness of breath.    Cardiovascular:  Negative for chest pain and palpitations.   Gastrointestinal:  Positive for nausea and vomiting. Negative for  "abdominal pain.   Genitourinary:  Negative for dysuria and hematuria.   Musculoskeletal:  Negative for arthralgias and back pain.   Skin:  Negative for color change and rash.   Neurological:  Negative for seizures and syncope.   All other systems reviewed and are negative.      Objective   /72   Pulse 76   Temp (!) 96.8 °F (36 °C)   Resp 16   Ht 5' 6\" (1.676 m)   Wt 108 kg (239 lb)   BMI 38.58 kg/m²      Physical Exam  Constitutional:       Appearance: Normal appearance.   HENT:      Head: Normocephalic and atraumatic.      Right Ear: Tympanic membrane, ear canal and external ear normal.      Left Ear: Tympanic membrane, ear canal and external ear normal.      Nose: Nose normal.      Mouth/Throat:      Mouth: Mucous membranes are moist.      Pharynx: Oropharynx is clear.   Eyes:      Extraocular Movements: Extraocular movements intact.      Conjunctiva/sclera: Conjunctivae normal.      Pupils: Pupils are equal, round, and reactive to light.   Cardiovascular:      Rate and Rhythm: Normal rate and regular rhythm.      Pulses: Normal pulses.      Heart sounds: Normal heart sounds.   Pulmonary:      Effort: Pulmonary effort is normal.      Breath sounds: Normal breath sounds.   Abdominal:      General: Abdomen is flat. Bowel sounds are normal.      Palpations: Abdomen is soft.   Musculoskeletal:         General: Normal range of motion.      Cervical back: Normal range of motion.   Skin:     General: Skin is warm and dry.      Capillary Refill: Capillary refill takes less than 2 seconds.   Neurological:      General: No focal deficit present.      Mental Status: He is alert and oriented to person, place, and time.   Psychiatric:         Mood and Affect: Mood normal.         Behavior: Behavior normal.         "

## 2025-03-20 ENCOUNTER — RESULTS FOLLOW-UP (OUTPATIENT)
Dept: FAMILY MEDICINE CLINIC | Facility: CLINIC | Age: 50
End: 2025-03-20

## 2025-03-20 DIAGNOSIS — E55.9 VITAMIN D DEFICIENCY: Primary | ICD-10-CM

## 2025-03-20 RX ORDER — ERGOCALCIFEROL 1.25 MG/1
50000 CAPSULE, LIQUID FILLED ORAL WEEKLY
Qty: 12 CAPSULE | Refills: 0 | Status: SHIPPED | OUTPATIENT
Start: 2025-03-20

## 2025-03-20 NOTE — TELEPHONE ENCOUNTER
----- Message from Shamar Jenkins DO sent at 3/20/2025  9:33 AM EDT -----  Please call the patient regarding his abnormal result.  Vitamin D deficient take vitamin D 50,000 units weekly for 3 months then 1000 units daily recheck vitamin D level in 6 months.  Hemoglobin A1c is in the prediabetic range.  Cut down on carbohydrate include 8 hours bread noodles and rice as well as sweets.

## 2025-03-20 NOTE — TELEPHONE ENCOUNTER
Called patient spoke with son Anton relayed message below and he understood.  Will relay message to his father and  script from pharmacy.

## 2025-04-08 ENCOUNTER — OFFICE VISIT (OUTPATIENT)
Dept: UROLOGY | Facility: MEDICAL CENTER | Age: 50
End: 2025-04-08

## 2025-04-08 VITALS
WEIGHT: 236.2 LBS | DIASTOLIC BLOOD PRESSURE: 74 MMHG | HEART RATE: 56 BPM | HEIGHT: 66 IN | OXYGEN SATURATION: 95 % | SYSTOLIC BLOOD PRESSURE: 116 MMHG | BODY MASS INDEX: 37.96 KG/M2

## 2025-04-08 DIAGNOSIS — C64.1 RENAL CELL CARCINOMA OF RIGHT KIDNEY (HCC): Primary | ICD-10-CM

## 2025-04-08 DIAGNOSIS — N40.1 BPH WITH URINARY OBSTRUCTION: ICD-10-CM

## 2025-04-08 DIAGNOSIS — N13.8 BPH WITH URINARY OBSTRUCTION: ICD-10-CM

## 2025-04-08 PROCEDURE — 99213 OFFICE O/P EST LOW 20 MIN: CPT | Performed by: UROLOGY

## 2025-04-08 NOTE — ASSESSMENT & PLAN NOTE
Right RCC s/p robotic right radical nephrectomy om 6/17/2024  pT1b clear cell RCC, G1, negative margins  Low Risk category per AUA guidelines  GFR 77 on 2/7/2025  CT chest/abdomen/pelvis (2/19/2025): negative for recurrence or metastasis   - continue annual imaging x 5 years  Orders:    CT abdomen pelvis w wo contrast; Future    CT chest wo contrast; Future

## 2025-04-08 NOTE — PROGRESS NOTES
Name: Pj Vences      : 1975      MRN: [id number]  Encounter Provider: Chapincito Corea MD  Encounter Date: 2025   Encounter department: [facility] UROLOGY [place]  :  Assessment & Plan  Renal cell carcinoma of right kidney (HCC)  Right RCC s/p robotic right radical nephrectomy om 2024  pT1b clear cell RCC, G1, negative margins  Low Risk category per AUA guidelines  GFR 77 on 2025  CT chest/abdomen/pelvis (2025): negative for recurrence or metastasis   - continue annual imaging x 5 years  Orders:    CT abdomen pelvis w wo contrast; Future    CT chest wo contrast; Future    BPH with urinary obstruction  No obstructive symptoms, nocturia resolved with fluid restriction at night   cc on bladder scan  PSA 0.445 (3/18/2025)  - limit fluid intake prior to bedtime           History of Present Illness   Pj Vences is a 49 y.o. male who presents for routine follow up    Completed CT chest/abdomen/pelvis that shows no evidence of recurrence or metastasis    PSA was normal at 0.445  No more nocturia     Review of Systems   All other systems reviewed and are negative.    Past Medical History   Past Medical History:   Diagnosis Date    Cancer (HCC)     Kidney Right removed    Diverticulosis     GERD (gastroesophageal reflux disease)     Renal mass, right     rt nephrectomy today 2024     Past Surgical History:   Procedure Laterality Date    NO PAST SURGERIES      RI LAPAROSCOPY RADICAL NEPHRECTOMY Right 2024    Procedure: NEPHRECTOMY RADICAL  W/ ROBOTICS;  Surgeon: Chapincito Corea MD;  Location: [facility] OR;  Service: Urology     Family History   Problem Relation Age of Onset    No Known Problems Father     Cancer Mother         vaginal      reports that he quit smoking about 6 years ago. His smoking use included cigarettes. He has never used smokeless tobacco. He reports current alcohol use. He reports that he does not currently use drugs.  Current Outpatient  "Medications   Medication Instructions    acetaminophen (TYLENOL) 650 mg, Oral, Every 6 hours scheduled    ergocalciferol (VITAMIN D2) 50,000 Units, Oral, Weekly    prochlorperazine (COMPAZINE) 5 mg, Oral, Every 6 hours PRN   No Known Allergies      Objective   /74 (BP Location: Left arm, Patient Position: Sitting, Cuff Size: Large)   Pulse 56   Ht 5' 6\" (1.676 m)   Wt 107 kg (236 lb 3.2 oz)   SpO2 95%   BMI 38.12 kg/m²     Physical Exam  Vitals and nursing note reviewed.   Constitutional:       General: He is not in acute distress.     Appearance: He is well-developed.   HENT:      Head: Normocephalic and atraumatic.   Eyes:      Conjunctiva/sclera: Conjunctivae normal.   Cardiovascular:      Rate and Rhythm: Normal rate and regular rhythm.      Heart sounds: No murmur heard.  Pulmonary:      Effort: Pulmonary effort is normal. No respiratory distress.      Breath sounds: Normal breath sounds.   Abdominal:      Palpations: Abdomen is soft.      Tenderness: There is no abdominal tenderness.   Musculoskeletal:         General: No swelling.      Cervical back: Neck supple.   Skin:     General: Skin is warm and dry.      Capillary Refill: Capillary refill takes less than 2 seconds.   Neurological:      Mental Status: He is alert.   Psychiatric:         Mood and Affect: Mood normal.          Results   Lab Results   Component Value Date    PSA 0.445 03/18/2025     Lab Results   Component Value Date    CALCIUM 9.6 03/18/2025    K 4.0 03/18/2025    CO2 28 03/18/2025     03/18/2025    BUN 18 03/18/2025    CREATININE 1.11 03/18/2025     Lab Results   Component Value Date    WBC 5.69 08/18/2024    HGB 13.4 08/18/2024    HCT 41.2 08/18/2024    MCV 85 08/18/2024     08/18/2024       Office Urine Dip  No results found for this or any previous visit (from the past hour).      "

## 2025-08-11 ENCOUNTER — TELEPHONE (OUTPATIENT)
Dept: FAMILY MEDICINE CLINIC | Facility: CLINIC | Age: 50
End: 2025-08-11

## 2025-08-12 ENCOUNTER — OFFICE VISIT (OUTPATIENT)
Dept: FAMILY MEDICINE CLINIC | Facility: CLINIC | Age: 50
End: 2025-08-12

## (undated) DEVICE — LAPAROSCOPIC DUAL RIGID APPLICATOR: Brand: ETHICON

## (undated) DEVICE — GLOVE SRG BIOGEL 7.5

## (undated) DEVICE — MONOPOLAR CURVED SCISSORS: Brand: ENDOWRIST

## (undated) DEVICE — THE ECHELON FLEX POWERED PLUS ARTICULATING ENDOSCOPIC LINEAR CUTTERS ARE STERILE, SINGLE PATIENT USE INSTRUMENTS THAT SIMULTANEOUSLYCUT AND STAPLE TISSUE. THERE ARE SIX STAGGERED ROWS OF STAPLES, THREE ON EITHER SIDE OF THE CUT LINE. THE ECHELON FLEX 45 POWERED PLUSINSTRUMENTS HAVE A STAPLE LINE THAT IS APPROXIMATELY 45 MM LONG AND A CUT LINE THAT IS APPROXIMATELY 42 MM LONG. THE SHAFT CAN ROTATE FREELYIN BOTH DIRECTIONS AND AN ARTICULATION MECHANISM ENABLES THE DISTAL PORTION OF THE SHAFT TO PIVOT TO FACILITATE LATERAL ACCESS TO THE OPERATIVESITE.THE INSTRUMENTS ARE PACKAGED WITH A PRIMARY LITHIUM BATTERY PACK THAT MUST BE INSTALLED PRIOR TO USE. THERE ARE SPECIFIC REQUIREMENTS FORDISPOSING OF THE BATTERY PACK. REFER TO THE BATTERY PACK DISPOSAL SECTION.THE INSTRUMENTS ARE PACKAGED WITHOUT A RELOAD AND MUST BE LOADED PRIOR TO USE. A STAPLE RETAINING CAP ON THE RELOAD PROTECTS THE STAPLE LEGPOINTS DURING SHIPPING AND TRANSPORTATION. THE INSTRUMENTS’ LOCK-OUT FEATURE IS DESIGNED TO PREVENT A USED OR IMPROPERLY INSTALLED RELOADFROM BEING REFIRED OR AN INSTRUMENT FROM BEING FIRED WITHOUT A RELOAD.: Brand: ECHELON FLEX

## (undated) DEVICE — EXOFIN PRECISION PEN HIGH VISCOSITY TOPICAL SKIN ADHESIVE: Brand: EXOFIN PRECISION PEN, 1G

## (undated) DEVICE — DRAPE SHEET X-LG

## (undated) DEVICE — JP PERF DRN SIL FLT 10MM FULL: Brand: CARDINAL HEALTH

## (undated) DEVICE — VISUALIZATION SYSTEM: Brand: CLEARIFY

## (undated) DEVICE — HEM-O-LOK CLIP CARTRIDGE LARGE DA VINCI SI/XI 6CLIPS/EA

## (undated) DEVICE — VESSEL SEALER EXTEND: Brand: ENDOWRIST

## (undated) DEVICE — INTENDED FOR TISSUE SEPARATION, AND OTHER PROCEDURES THAT REQUIRE A SHARP SURGICAL BLADE TO PUNCTURE OR CUT.: Brand: BARD-PARKER SAFETY BLADES SIZE 15, STERILE

## (undated) DEVICE — FENESTRATED BIPOLAR FORCEPS: Brand: ENDOWRIST

## (undated) DEVICE — TROCAR PORT ACCESS 12 X120MM W/BLDLS OPTICAL TIP AIRSEAL

## (undated) DEVICE — JACKSON-PRATT 100CC BULB RESERVOIR: Brand: CARDINAL HEALTH

## (undated) DEVICE — GLOVE SRG BIOGEL ECLIPSE 7.5

## (undated) DEVICE — GLOVE SRG BIOGEL 6.5

## (undated) DEVICE — INTENDED FOR TISSUE SEPARATION, AND OTHER PROCEDURES THAT REQUIRE A SHARP SURGICAL BLADE TO PUNCTURE OR CUT.: Brand: BARD-PARKER SAFETY BLADES SIZE 11, STERILE

## (undated) DEVICE — SUT MONOCRYL 4-0 PS-2 27 IN Y426H

## (undated) DEVICE — CANNULA SEAL

## (undated) DEVICE — TROCAR: Brand: KII SLEEVE

## (undated) DEVICE — COLUMN DRAPE

## (undated) DEVICE — THE ECHELON, ECHELON ENDOPATH™ AND ECHELON FLEX™ FAMILIES OF ENDOSCOPIC LINEAR CUTTERS AND RELOADS ARE STERILE, SINGLE PATIENT USE INSTRUMENTS THAT SIMULTANEOUSLY CUT AND STAPLE TISSUE. THERE ARE SIX STAGGERED ROWS OF STAPLES, THREE ON EITHER SIDE OF THE CUT LINE. THE 45 MM INSTRUMENTS HAVE A STAPLE LINE THATIS APPROXIMATELY 45 MM LONG AND A CUT LINE THAT IS APPROXIMATELY 42 MM LONG. THE SHAFT CAN ROTATE FREELY IN BOTH DIRECTIONS AND AN ARTICULATION MECHANISM ON ARTICULATING INSTRUMENTS ENABLES BENDING THE DISTAL PORTIONOF THE SHAFT TO FACILITATE LATERAL ACCESS OF THE OPERATIVE SITE.THE INSTRUMENTS ARE SHIPPED WITHOUT A RELOAD AND MUST BE LOADED PRIOR TO USE. A STAPLE RETAINING CAP ON THE RELOAD PROTECTS THE STAPLE LEG POINTS DURING SHIPPING AND TRANSPORTATION. THE INSTRUMENTS’ LOCK-OUT FEATURE IS DESIGNED TO PREVENT A USED RELOAD FROM BEING REFIRED.: Brand: ECHELON ENDOPATH

## (undated) DEVICE — GLOVE INDICATOR PI UNDERGLOVE SZ 7 BLUE

## (undated) DEVICE — ELECTRO LUBE IS A SINGLE PATIENT USE DEVICE THAT IS INTENDED TO BE USED ON ELECTROSURGICAL ELECTRODES TO REDUCE STICKING.: Brand: KEY SURGICAL ELECTRO LUBE

## (undated) DEVICE — IRRIG ENDO FLO TUBING

## (undated) DEVICE — TIP COVER ACCESSORY

## (undated) DEVICE — ENDOPATH PNEUMONEEDLE INSUFFLATION NEEDLES WITH LUER LOCK CONNECTORS 120MM: Brand: ENDOPATH

## (undated) DEVICE — SURGICEL 4 X 8IN

## (undated) DEVICE — PENCIL ELECTROSURG E-Z CLEAN -0035H

## (undated) DEVICE — ARM DRAPE

## (undated) DEVICE — SEALANT FIBRIN VISTASEAL 10ML

## (undated) DEVICE — AIRSEAL TUBE SMOKE EVAC LUMENX3 FILTERED

## (undated) DEVICE — TRAY FOLEY 16FR URIMETER SURESTEP

## (undated) DEVICE — GLOVE INDICATOR PI UNDERGLOVE SZ 7.5 BLUE

## (undated) DEVICE — BETHLEHEM MAJOR GENERAL PACK: Brand: CARDINAL HEALTH

## (undated) DEVICE — METZENBAUM ADTEC SINGLE USE DISSECTING SCISSORS, SHAFT ONLY, MONOPOLAR, CURVED TO LEFT, WORKING LENGTH: 12 1/4", (310 MM), DIAM. 5 MM, INSULATED, DOUBLE ACTION, STERILE, DISPOSABLE, PACKAGE OF 10 PIECES: Brand: AESCULAP

## (undated) DEVICE — SUT PDS II 0 CT-1 27 IN Z340H

## (undated) DEVICE — PROGRASP FORCEPS: Brand: ENDOWRIST

## (undated) DEVICE — GLOVE INDICATOR PI UNDERGLOVE SZ 8 BLUE